# Patient Record
Sex: MALE | Race: WHITE | NOT HISPANIC OR LATINO | Employment: UNEMPLOYED | URBAN - METROPOLITAN AREA
[De-identification: names, ages, dates, MRNs, and addresses within clinical notes are randomized per-mention and may not be internally consistent; named-entity substitution may affect disease eponyms.]

---

## 2019-09-06 PROBLEM — Z13.9 ENCOUNTER FOR MEDICAL SCREENING EXAMINATION: Chronic | Status: ACTIVE | Noted: 2019-09-06

## 2019-09-06 PROBLEM — R53.83 FATIGUE: Status: ACTIVE | Noted: 2019-09-06

## 2019-09-06 PROBLEM — F29 PSYCHOSIS: Status: ACTIVE | Noted: 2019-09-06

## 2019-12-09 PROBLEM — Z13.9 ENCOUNTER FOR MEDICAL SCREENING EXAMINATION: Chronic | Status: RESOLVED | Noted: 2019-09-06 | Resolved: 2019-12-09

## 2021-02-10 PROBLEM — F32.3 MAJOR DEPRESSION WITH PSYCHOTIC FEATURES: Status: ACTIVE | Noted: 2021-02-10

## 2022-03-15 ENCOUNTER — HISTORICAL (OUTPATIENT)
Dept: ADMINISTRATIVE | Facility: HOSPITAL | Age: 37
End: 2022-03-15

## 2022-09-28 ENCOUNTER — HOSPITAL ENCOUNTER (EMERGENCY)
Facility: HOSPITAL | Age: 37
Discharge: PSYCHIATRIC OR SUBSTANCE USE FACILITY | End: 2022-09-29
Attending: FAMILY MEDICINE
Payer: MEDICAID

## 2022-09-28 VITALS
DIASTOLIC BLOOD PRESSURE: 76 MMHG | SYSTOLIC BLOOD PRESSURE: 107 MMHG | OXYGEN SATURATION: 99 % | HEIGHT: 74 IN | TEMPERATURE: 98 F | HEART RATE: 93 BPM | WEIGHT: 154.31 LBS | RESPIRATION RATE: 16 BRPM | BODY MASS INDEX: 19.8 KG/M2

## 2022-09-28 DIAGNOSIS — F19.10 DRUG ABUSE: ICD-10-CM

## 2022-09-28 DIAGNOSIS — R44.3 HALLUCINATIONS: ICD-10-CM

## 2022-09-28 DIAGNOSIS — Z00.8 MEDICAL CLEARANCE FOR PSYCHIATRIC ADMISSION: Primary | ICD-10-CM

## 2022-09-28 LAB
ALBUMIN SERPL-MCNC: 4.4 GM/DL (ref 3.5–5)
ALBUMIN/GLOB SERPL: 1.5 RATIO (ref 1.1–2)
ALP SERPL-CCNC: 77 UNIT/L (ref 40–150)
ALT SERPL-CCNC: 71 UNIT/L (ref 0–55)
AMPHET UR QL SCN: POSITIVE
APAP SERPL-MCNC: <17.4 UG/ML (ref 17.4–30)
APPEARANCE UR: CLEAR
AST SERPL-CCNC: 72 UNIT/L (ref 5–34)
BACTERIA #/AREA URNS AUTO: ABNORMAL /HPF
BARBITURATE SCN PRESENT UR: NEGATIVE
BASOPHILS # BLD AUTO: 0.05 X10(3)/MCL (ref 0–0.2)
BASOPHILS NFR BLD AUTO: 0.4 %
BENZODIAZ UR QL SCN: POSITIVE
BILIRUB UR QL STRIP.AUTO: NEGATIVE MG/DL
BILIRUBIN DIRECT+TOT PNL SERPL-MCNC: 2.6 MG/DL
BUN SERPL-MCNC: 21.7 MG/DL (ref 8.9–20.6)
CALCIUM SERPL-MCNC: 9.8 MG/DL (ref 8.4–10.2)
CANNABINOIDS UR QL SCN: NEGATIVE
CHLORIDE SERPL-SCNC: 99 MMOL/L (ref 98–107)
CO2 SERPL-SCNC: 26 MMOL/L (ref 22–29)
COCAINE UR QL SCN: NEGATIVE
COLOR UR AUTO: ABNORMAL
CREAT SERPL-MCNC: 1.02 MG/DL (ref 0.73–1.18)
EOSINOPHIL # BLD AUTO: 0.16 X10(3)/MCL (ref 0–0.9)
EOSINOPHIL NFR BLD AUTO: 1.4 %
ERYTHROCYTE [DISTWIDTH] IN BLOOD BY AUTOMATED COUNT: 14.6 % (ref 11.5–17)
ETHANOL SERPL-MCNC: <10 MG/DL
FENTANYL UR QL SCN: NEGATIVE
FLUAV AG UPPER RESP QL IA.RAPID: NOT DETECTED
FLUBV AG UPPER RESP QL IA.RAPID: NOT DETECTED
GFR SERPLBLD CREATININE-BSD FMLA CKD-EPI: >60 MLS/MIN/1.73/M2
GLOBULIN SER-MCNC: 2.9 GM/DL (ref 2.4–3.5)
GLUCOSE SERPL-MCNC: 90 MG/DL (ref 74–100)
GLUCOSE UR QL STRIP.AUTO: NORMAL MG/DL
HCT VFR BLD AUTO: 45.4 % (ref 42–52)
HGB BLD-MCNC: 15.7 GM/DL (ref 14–18)
HYALINE CASTS #/AREA URNS LPF: ABNORMAL /LPF
IMM GRANULOCYTES # BLD AUTO: 0.03 X10(3)/MCL (ref 0–0.04)
IMM GRANULOCYTES NFR BLD AUTO: 0.3 %
KETONES UR QL STRIP.AUTO: NEGATIVE MG/DL
LEUKOCYTE ESTERASE UR QL STRIP.AUTO: NEGATIVE UNIT/L
LYMPHOCYTES # BLD AUTO: 2.19 X10(3)/MCL (ref 0.6–4.6)
LYMPHOCYTES NFR BLD AUTO: 18.7 %
MCH RBC QN AUTO: 31.9 PG (ref 27–31)
MCHC RBC AUTO-ENTMCNC: 34.6 MG/DL (ref 33–36)
MCV RBC AUTO: 92.3 FL (ref 80–94)
MDMA UR QL SCN: NEGATIVE
MONOCYTES # BLD AUTO: 1.18 X10(3)/MCL (ref 0.1–1.3)
MONOCYTES NFR BLD AUTO: 10.1 %
MUCOUS THREADS URNS QL MICRO: ABNORMAL /LPF
NEUTROPHILS # BLD AUTO: 8.1 X10(3)/MCL (ref 2.1–9.2)
NEUTROPHILS NFR BLD AUTO: 69.1 %
NITRITE UR QL STRIP.AUTO: NEGATIVE
NRBC BLD AUTO-RTO: 0 %
OPIATES UR QL SCN: NEGATIVE
PCP UR QL: NEGATIVE
PH UR STRIP.AUTO: 5 [PH]
PH UR: 5 [PH] (ref 3–11)
PLATELET # BLD AUTO: 260 X10(3)/MCL (ref 130–400)
PMV BLD AUTO: 10.8 FL (ref 7.4–10.4)
POTASSIUM SERPL-SCNC: 3.2 MMOL/L (ref 3.5–5.1)
PROT SERPL-MCNC: 7.3 GM/DL (ref 6.4–8.3)
PROT UR QL STRIP.AUTO: NEGATIVE MG/DL
RBC # BLD AUTO: 4.92 X10(6)/MCL (ref 4.7–6.1)
RBC #/AREA URNS AUTO: ABNORMAL /HPF
RBC UR QL AUTO: ABNORMAL UNIT/L
SARS-COV-2 RNA RESP QL NAA+PROBE: NOT DETECTED
SODIUM SERPL-SCNC: 136 MMOL/L (ref 136–145)
SP GR UR STRIP.AUTO: 1.01
SPECIFIC GRAVITY, URINE AUTO (.000) (OHS): 1.01 (ref 1–1.03)
SQUAMOUS #/AREA URNS LPF: ABNORMAL /HPF
TSH SERPL-ACNC: 0.82 UIU/ML (ref 0.35–4.94)
UROBILINOGEN UR STRIP-ACNC: NORMAL MG/DL
WBC # SPEC AUTO: 11.7 X10(3)/MCL (ref 4.5–11.5)
WBC #/AREA URNS AUTO: ABNORMAL /HPF

## 2022-09-28 PROCEDURE — 96372 THER/PROPH/DIAG INJ SC/IM: CPT | Performed by: STUDENT IN AN ORGANIZED HEALTH CARE EDUCATION/TRAINING PROGRAM

## 2022-09-28 PROCEDURE — 80143 DRUG ASSAY ACETAMINOPHEN: CPT | Performed by: STUDENT IN AN ORGANIZED HEALTH CARE EDUCATION/TRAINING PROGRAM

## 2022-09-28 PROCEDURE — 93005 ELECTROCARDIOGRAM TRACING: CPT

## 2022-09-28 PROCEDURE — 87636 SARSCOV2 & INF A&B AMP PRB: CPT | Performed by: STUDENT IN AN ORGANIZED HEALTH CARE EDUCATION/TRAINING PROGRAM

## 2022-09-28 PROCEDURE — 80053 COMPREHEN METABOLIC PANEL: CPT | Performed by: STUDENT IN AN ORGANIZED HEALTH CARE EDUCATION/TRAINING PROGRAM

## 2022-09-28 PROCEDURE — 80307 DRUG TEST PRSMV CHEM ANLYZR: CPT | Performed by: STUDENT IN AN ORGANIZED HEALTH CARE EDUCATION/TRAINING PROGRAM

## 2022-09-28 PROCEDURE — 63600175 PHARM REV CODE 636 W HCPCS: Performed by: STUDENT IN AN ORGANIZED HEALTH CARE EDUCATION/TRAINING PROGRAM

## 2022-09-28 PROCEDURE — 36415 COLL VENOUS BLD VENIPUNCTURE: CPT | Performed by: STUDENT IN AN ORGANIZED HEALTH CARE EDUCATION/TRAINING PROGRAM

## 2022-09-28 PROCEDURE — 99285 EMERGENCY DEPT VISIT HI MDM: CPT | Mod: 25

## 2022-09-28 PROCEDURE — 81001 URINALYSIS AUTO W/SCOPE: CPT | Performed by: STUDENT IN AN ORGANIZED HEALTH CARE EDUCATION/TRAINING PROGRAM

## 2022-09-28 PROCEDURE — 82077 ASSAY SPEC XCP UR&BREATH IA: CPT | Performed by: STUDENT IN AN ORGANIZED HEALTH CARE EDUCATION/TRAINING PROGRAM

## 2022-09-28 PROCEDURE — 85025 COMPLETE CBC W/AUTO DIFF WBC: CPT | Performed by: STUDENT IN AN ORGANIZED HEALTH CARE EDUCATION/TRAINING PROGRAM

## 2022-09-28 PROCEDURE — 84443 ASSAY THYROID STIM HORMONE: CPT | Performed by: STUDENT IN AN ORGANIZED HEALTH CARE EDUCATION/TRAINING PROGRAM

## 2022-09-28 PROCEDURE — 25000003 PHARM REV CODE 250: Performed by: STUDENT IN AN ORGANIZED HEALTH CARE EDUCATION/TRAINING PROGRAM

## 2022-09-28 RX ORDER — MIDAZOLAM HYDROCHLORIDE 1 MG/ML
2 INJECTION INTRAMUSCULAR; INTRAVENOUS
Status: COMPLETED | OUTPATIENT
Start: 2022-09-28 | End: 2022-09-28

## 2022-09-28 RX ORDER — ZIPRASIDONE MESYLATE 20 MG/ML
20 INJECTION, POWDER, LYOPHILIZED, FOR SOLUTION INTRAMUSCULAR
Status: COMPLETED | OUTPATIENT
Start: 2022-09-28 | End: 2022-09-28

## 2022-09-28 RX ORDER — DIPHENHYDRAMINE HYDROCHLORIDE 50 MG/ML
50 INJECTION INTRAMUSCULAR; INTRAVENOUS
Status: COMPLETED | OUTPATIENT
Start: 2022-09-28 | End: 2022-09-28

## 2022-09-28 RX ADMIN — DIPHENHYDRAMINE HYDROCHLORIDE 50 MG: 50 INJECTION INTRAMUSCULAR; INTRAVENOUS at 03:09

## 2022-09-28 RX ADMIN — MIDAZOLAM 2 MG: 1 INJECTION INTRAMUSCULAR; INTRAVENOUS at 03:09

## 2022-09-28 RX ADMIN — SODIUM CHLORIDE 1000 ML: 9 INJECTION, SOLUTION INTRAVENOUS at 04:09

## 2022-09-28 RX ADMIN — ZIPRASIDONE MESYLATE 20 MG: 20 INJECTION, POWDER, LYOPHILIZED, FOR SOLUTION INTRAMUSCULAR at 03:09

## 2022-09-28 NOTE — ED PROVIDER NOTES
Encounter Date: 9/28/2022       History     Chief Complaint   Patient presents with    Drug Overdose     Pt here for altered mental status due to possible drug use. No traumatic injures noted at this time.      Patient 37-year-old male presents to ED for altered mental status.  Brought in by EMS.  Apparently presented franticly to a restaurant requesting help me. endorsed drug use but did not specify which.  He was hallucinating, paranoid, talking himself. initially tachycardia. denies any alcohol abuse, no trauma, is actively responding to internal stimuli. denies any physical complaints. No shortness of breath, no other complaints or concerns at this time.    Review of patient's allergies indicates:  No Known Allergies  History reviewed. No pertinent past medical history.  History reviewed. No pertinent surgical history.  History reviewed. No pertinent family history.  Social History     Tobacco Use    Smoking status: Every Day     Packs/day: 1.00     Types: Cigarettes    Smokeless tobacco: Never   Substance Use Topics    Drug use: Not Currently     Review of Systems   Constitutional:  Negative for chills and fever.   HENT:  Negative for congestion, rhinorrhea and sore throat.    Eyes:  Negative for pain, discharge and itching.   Respiratory:  Negative for chest tightness and shortness of breath.    Cardiovascular:  Negative for chest pain and palpitations.   Gastrointestinal:  Negative for abdominal pain, nausea and vomiting.   Genitourinary:  Negative for dysuria and hematuria.   Musculoskeletal:  Negative for myalgias and neck pain.   Skin:  Negative for color change and rash.   Neurological:  Negative for dizziness, weakness and headaches.   Psychiatric/Behavioral:  Positive for hallucinations. Negative for confusion and suicidal ideas. The patient is nervous/anxious and is hyperactive.      Physical Exam     Initial Vitals [09/28/22 1417]   BP Pulse Resp Temp SpO2   119/80 (!) 126 16 98.2 °F (36.8 °C) 99 %       MAP       --         Physical Exam    Constitutional: He appears well-developed and well-nourished. He is not diaphoretic. No distress.   HENT:   Head: Normocephalic and atraumatic.   Eyes: Conjunctivae and EOM are normal. Pupils are equal, round, and reactive to light.   Neck: Neck supple. No tracheal deviation present.   Normal range of motion.  Cardiovascular:  Regular rhythm and normal heart sounds.   Tachycardia present.         Pulmonary/Chest: Breath sounds normal. No respiratory distress.   Abdominal: Abdomen is soft. There is no abdominal tenderness. There is no rebound.   Musculoskeletal:         General: No tenderness. Normal range of motion.      Cervical back: Normal range of motion and neck supple.     Neurological: He is alert and oriented to person, place, and time. He has normal strength. GCS score is 15. GCS eye subscore is 4. GCS verbal subscore is 5. GCS motor subscore is 6.   Skin: Skin is warm and dry. Capillary refill takes less than 2 seconds. No rash noted.   Psychiatric: His behavior is normal. Judgment normal. His mood appears anxious. His speech is rapid and/or pressured. He is actively hallucinating. Thought content is paranoid and delusional. He expresses no homicidal and no suicidal ideation. He expresses no suicidal plans and no homicidal plans. He is attentive.       ED Course   Critical Care    Date/Time: 9/28/2022 4:58 PM  Performed by: Rodney De La Torre MD  Authorized by: Rodney De La Torre MD   Total critical care time (exclusive of procedural time) : 30 minutes  Critical care time was exclusive of separately billable procedures and treating other patients.  Critical care was necessary to treat or prevent imminent or life-threatening deterioration of the following conditions: CNS failure or compromise.  Critical care was time spent personally by me on the following activities: evaluation of patient's response to treatment, obtaining history from patient or surrogate, ordering and review  of laboratory studies, pulse oximetry, review of old charts, development of treatment plan with patient or surrogate, examination of patient, ordering and performing treatments and interventions, ordering and review of radiographic studies and re-evaluation of patient's condition.  Comments: Chemical sedation      Labs Reviewed   COMPREHENSIVE METABOLIC PANEL - Abnormal; Notable for the following components:       Result Value    Potassium Level 3.2 (*)     Blood Urea Nitrogen 21.7 (*)     Bilirubin Total 2.6 (*)     Alanine Aminotransferase 71 (*)     Aspartate Aminotransferase 72 (*)     All other components within normal limits   ACETAMINOPHEN LEVEL - Abnormal; Notable for the following components:    Acetaminophen Level <17.4 (*)     All other components within normal limits   CBC WITH DIFFERENTIAL - Abnormal; Notable for the following components:    WBC 11.7 (*)     MCH 31.9 (*)     MPV 10.8 (*)     All other components within normal limits   TSH - Normal   ALCOHOL,MEDICAL (ETHANOL) - Normal   COVID/FLU A&B PCR - Normal   CBC W/ AUTO DIFFERENTIAL    Narrative:     The following orders were created for panel order CBC auto differential.  Procedure                               Abnormality         Status                     ---------                               -----------         ------                     CBC with Differential[[id number]]        Abnormal            Final result                 Please view results for these tests on the individual orders.   URINALYSIS, REFLEX TO URINE CULTURE   DRUG SCREEN, URINE (BEAKER)   EXTRA TUBES    Narrative:     The following orders were created for panel order EXTRA TUBES.  Procedure                               Abnormality         Status                     ---------                               -----------         ------                     Light Blue Top Hold[[id number]]                              In process                 Red Top Hold[[id number]]                                      In process                   Please view results for these tests on the individual orders.   LIGHT BLUE TOP HOLD   RED TOP HOLD     EKG Readings: (Independently Interpreted)   Initial Reading: No STEMI. Rhythm: Sinus Tachycardia. Ectopy: No Ectopy. Conduction: Normal. Axis: Normal. Clinical Impression: Sinus Tachycardia   ECG Results              EKG 12-lead (In process)  Result time 09/28/22 15:07:54      In process by Interface, Lab In [facility] (09/28/22 15:07:54)                   Narrative:    Test Reason : R07.9,    Vent. Rate : 105 BPM     Atrial Rate : 105 BPM     P-R Int : 130 ms          QRS Dur : 098 ms      QT Int : 364 ms       P-R-T Axes : 075 081 055 degrees     QTc Int : 481 ms    Sinus tachycardia  Incomplete right bundle branch block  Borderline Abnormal ECG  No previous ECGs available    Referred By: [email or web address]   SELF           Confirmed By:                                   Imaging Results    None          Medications   sodium chloride 0.9% bolus 1,000 mL (1,000 mLs Intravenous New Bag 9/28/22 1603)   ziprasidone injection 20 mg (20 mg Intramuscular Given 9/28/22 1541)   midazolam (VERSED) 1 mg/mL injection 2 mg (2 mg Intramuscular Given 9/28/22 1541)   diphenhydrAMINE injection 50 mg (50 mg Intramuscular Given 9/28/22 1541)     Medical Decision Making:   Clinical Tests:   Lab Tests: Reviewed and Ordered  Medical Tests: Reviewed and Ordered  ED Management:  37-year-old male presents to ED after street drug use. hallucinating, responding to internal stimuli and paranoid. Has stable vitals besides initial tachycardia.  On exam no acute findings besides tachycardia and psychiatric findings as listed above. In room pt remained paranoid and verbally loud. persisted.  His ultimately given skin medical sedation for strain.  Afterwards he was resting comfortably in and IV was started and fluid was provided.  Tachycardia resolved. Laboratory analysis grossly unremarkable. At this time  patient is cleared for placement at 1st available psychiatric facility. PEC'd for patient's safety. Awaiting acceptance and transfer at this time. (Britt)                        Clinical Impression:   Final diagnoses:  [Z00.8] Medical clearance for psychiatric admission (Primary)  [R44.3] Hallucinations  [F19.10] Drug abuse        ED Disposition Condition    Transfer to Psych Facility Stable          ED Prescriptions    None       Follow-up Information    None          Rodney De La Torre MD  09/28/22 7484

## 2022-09-29 NOTE — ED NOTES
Pt discharged to [facility] for transport to [facility]

## 2022-11-09 ENCOUNTER — HOSPITAL ENCOUNTER (EMERGENCY)
Facility: HOSPITAL | Age: 37
Discharge: PSYCHIATRIC OR SUBSTANCE USE FACILITY | End: 2022-11-09
Attending: EMERGENCY MEDICINE
Payer: MEDICAID

## 2022-11-09 VITALS
DIASTOLIC BLOOD PRESSURE: 73 MMHG | HEART RATE: 95 BPM | WEIGHT: 140 LBS | TEMPERATURE: 99 F | HEIGHT: 74 IN | RESPIRATION RATE: 22 BRPM | BODY MASS INDEX: 17.97 KG/M2 | SYSTOLIC BLOOD PRESSURE: 120 MMHG | OXYGEN SATURATION: 100 %

## 2022-11-09 DIAGNOSIS — F29 PSYCHOSIS, UNSPECIFIED PSYCHOSIS TYPE: Primary | ICD-10-CM

## 2022-11-09 DIAGNOSIS — Z00.8 MEDICAL CLEARANCE FOR PSYCHIATRIC ADMISSION: ICD-10-CM

## 2022-11-09 LAB
ALBUMIN SERPL-MCNC: 4 GM/DL (ref 3.5–5)
ALBUMIN/GLOB SERPL: 1.5 RATIO (ref 1.1–2)
ALP SERPL-CCNC: 84 UNIT/L (ref 40–150)
ALT SERPL-CCNC: 23 UNIT/L (ref 0–55)
APAP SERPL-MCNC: <17.4 UG/ML (ref 17.4–30)
AST SERPL-CCNC: 30 UNIT/L (ref 5–34)
BASOPHILS # BLD AUTO: 0.04 X10(3)/MCL (ref 0–0.2)
BASOPHILS NFR BLD AUTO: 0.6 %
BILIRUBIN DIRECT+TOT PNL SERPL-MCNC: 0.8 MG/DL
BUN SERPL-MCNC: 7.5 MG/DL (ref 8.9–20.6)
CALCIUM SERPL-MCNC: 9.5 MG/DL (ref 8.4–10.2)
CHLORIDE SERPL-SCNC: 105 MMOL/L (ref 98–107)
CO2 SERPL-SCNC: 22 MMOL/L (ref 22–29)
CREAT SERPL-MCNC: 0.8 MG/DL (ref 0.73–1.18)
EOSINOPHIL # BLD AUTO: 0.06 X10(3)/MCL (ref 0–0.9)
EOSINOPHIL NFR BLD AUTO: 1 %
ERYTHROCYTE [DISTWIDTH] IN BLOOD BY AUTOMATED COUNT: 13.3 % (ref 11.5–17)
ETHANOL SERPL-MCNC: 84 MG/DL
GFR SERPLBLD CREATININE-BSD FMLA CKD-EPI: >60 MLS/MIN/1.73/M2
GLOBULIN SER-MCNC: 2.7 GM/DL (ref 2.4–3.5)
GLUCOSE SERPL-MCNC: 69 MG/DL (ref 74–100)
HCT VFR BLD AUTO: 41.3 % (ref 42–52)
HGB BLD-MCNC: 14.3 GM/DL (ref 14–18)
IMM GRANULOCYTES # BLD AUTO: 0.04 X10(3)/MCL (ref 0–0.04)
IMM GRANULOCYTES NFR BLD AUTO: 0.6 %
LYMPHOCYTES # BLD AUTO: 1.79 X10(3)/MCL (ref 0.6–4.6)
LYMPHOCYTES NFR BLD AUTO: 28.6 %
MCH RBC QN AUTO: 31.6 PG (ref 27–31)
MCHC RBC AUTO-ENTMCNC: 34.6 MG/DL (ref 33–36)
MCV RBC AUTO: 91.2 FL (ref 80–94)
MONOCYTES # BLD AUTO: 0.83 X10(3)/MCL (ref 0.1–1.3)
MONOCYTES NFR BLD AUTO: 13.3 %
NEUTROPHILS # BLD AUTO: 3.5 X10(3)/MCL (ref 2.1–9.2)
NEUTROPHILS NFR BLD AUTO: 55.9 %
NRBC BLD AUTO-RTO: 0 %
PLATELET # BLD AUTO: 213 X10(3)/MCL (ref 130–400)
PMV BLD AUTO: 11 FL (ref 7.4–10.4)
POTASSIUM SERPL-SCNC: 3.5 MMOL/L (ref 3.5–5.1)
PROT SERPL-MCNC: 6.7 GM/DL (ref 6.4–8.3)
RBC # BLD AUTO: 4.53 X10(6)/MCL (ref 4.7–6.1)
SALICYLATES SERPL-MCNC: <5 MG/DL
SARS-COV-2 RDRP RESP QL NAA+PROBE: NEGATIVE
SODIUM SERPL-SCNC: 140 MMOL/L (ref 136–145)
TSH SERPL-ACNC: 0.64 UIU/ML (ref 0.35–4.94)
WBC # SPEC AUTO: 6.3 X10(3)/MCL (ref 4.5–11.5)

## 2022-11-09 PROCEDURE — 99285 EMERGENCY DEPT VISIT HI MDM: CPT | Mod: 25

## 2022-11-09 PROCEDURE — 85025 COMPLETE CBC W/AUTO DIFF WBC: CPT | Performed by: EMERGENCY MEDICINE

## 2022-11-09 PROCEDURE — 63600175 PHARM REV CODE 636 W HCPCS: Performed by: EMERGENCY MEDICINE

## 2022-11-09 PROCEDURE — 80053 COMPREHEN METABOLIC PANEL: CPT | Performed by: EMERGENCY MEDICINE

## 2022-11-09 PROCEDURE — 80179 DRUG ASSAY SALICYLATE: CPT | Performed by: EMERGENCY MEDICINE

## 2022-11-09 PROCEDURE — 84443 ASSAY THYROID STIM HORMONE: CPT | Performed by: EMERGENCY MEDICINE

## 2022-11-09 PROCEDURE — 80143 DRUG ASSAY ACETAMINOPHEN: CPT | Performed by: EMERGENCY MEDICINE

## 2022-11-09 PROCEDURE — 96372 THER/PROPH/DIAG INJ SC/IM: CPT | Performed by: EMERGENCY MEDICINE

## 2022-11-09 PROCEDURE — 87635 SARS-COV-2 COVID-19 AMP PRB: CPT | Performed by: EMERGENCY MEDICINE

## 2022-11-09 PROCEDURE — 82077 ASSAY SPEC XCP UR&BREATH IA: CPT | Performed by: EMERGENCY MEDICINE

## 2022-11-09 RX ORDER — HALOPERIDOL 5 MG/ML
10 INJECTION INTRAMUSCULAR
Status: COMPLETED | OUTPATIENT
Start: 2022-11-09 | End: 2022-11-09

## 2022-11-09 RX ORDER — DIPHENHYDRAMINE HYDROCHLORIDE 50 MG/ML
25 INJECTION INTRAMUSCULAR; INTRAVENOUS
Status: COMPLETED | OUTPATIENT
Start: 2022-11-09 | End: 2022-11-09

## 2022-11-09 RX ORDER — LORAZEPAM 2 MG/ML
2 INJECTION INTRAMUSCULAR
Status: COMPLETED | OUTPATIENT
Start: 2022-11-09 | End: 2022-11-09

## 2022-11-09 RX ADMIN — HALOPERIDOL LACTATE 10 MG: 5 INJECTION, SOLUTION INTRAMUSCULAR at 02:11

## 2022-11-09 RX ADMIN — LORAZEPAM 2 MG: 2 INJECTION INTRAMUSCULAR; INTRAVENOUS at 02:11

## 2022-11-09 RX ADMIN — DIPHENHYDRAMINE HYDROCHLORIDE 25 MG: 50 INJECTION INTRAMUSCULAR; INTRAVENOUS at 02:11

## 2022-11-09 NOTE — ED PROVIDER NOTES
Encounter Date: 11/9/2022       History   No chief complaint on file.    The history is provided by the patient.   Mental Health Problem  The primary symptoms include bizarre behavior, disorganized speech and disorganized thinking. The current episode started today. This is a recurrent problem.   The onset of the illness is precipitated by drug abuse.   Review of patient's allergies indicates:  No Known Allergies  No past medical history on file.  No past surgical history on file.  No family history on file.  Social History     Tobacco Use    Smoking status: Every Day     Packs/day: 1.00     Types: Cigarettes    Smokeless tobacco: Never   Substance Use Topics    Drug use: Not Currently     Review of Systems   Unable to perform ROS: Psychiatric disorder     Physical Exam     Initial Vitals [11/09/22 1436]   BP Pulse Resp Temp SpO2   120/73 95 (!) 22 98.9 °F (37.2 °C) 100 %      MAP       --         Physical Exam    Nursing note and vitals reviewed.  Constitutional: He appears well-developed and well-nourished. No distress.   HENT:   Head: Normocephalic and atraumatic.   Mouth/Throat: Oropharynx is clear and moist.   Eyes: Conjunctivae and EOM are normal. Pupils are equal, round, and reactive to light.   Neck: Neck supple.   Normal range of motion.  Cardiovascular:  Normal rate, regular rhythm and normal heart sounds.     Exam reveals no gallop and no friction rub.       No murmur heard.  Pulmonary/Chest: Breath sounds normal. No respiratory distress. He has no wheezes. He has no rhonchi. He has no rales.   Abdominal: Abdomen is soft. Bowel sounds are normal. He exhibits no distension and no mass. There is no abdominal tenderness. There is no rebound and no guarding.   Musculoskeletal:         General: No edema. Normal range of motion.      Cervical back: Normal range of motion and neck supple.     Neurological: He is alert. He has normal strength.   Skin: Skin is warm and dry. No rash noted.   Psychiatric: His affect  is labile and inappropriate. His speech is rapid and/or pressured. He is agitated and hyperactive. Thought content is delusional.       ED Course   Procedures  Labs Reviewed   COMPREHENSIVE METABOLIC PANEL - Abnormal; Notable for the following components:       Result Value    Glucose Level 69 (*)     Blood Urea Nitrogen 7.5 (*)     All other components within normal limits   ALCOHOL,MEDICAL (ETHANOL) - Abnormal; Notable for the following components:    Ethanol Level 84.0 (*)     All other components within normal limits   ACETAMINOPHEN LEVEL - Abnormal; Notable for the following components:    Acetaminophen Level <17.4 (*)     All other components within normal limits   CBC WITH DIFFERENTIAL - Abnormal; Notable for the following components:    RBC 4.53 (*)     Hct 41.3 (*)     MCH 31.6 (*)     MPV 11.0 (*)     All other components within normal limits   SARS-COV-2 RNA AMPLIFICATION, QUAL - Normal    Narrative:     The IDNOW COVID-19 assay is a rapid molecular in vitro diagnostic test utilizing an isothermal nucleic acid amplification technology intended for the qualitative detection of nucleic acid from the SARS-CoV-2 viral RNA in direct nasal, nasopharyngeal or throat swabs from individuals who are suspected of COVID-19 by their healthcare provider.   CBC W/ AUTO DIFFERENTIAL    Narrative:     The following orders were created for panel order CBC Auto Differential.  Procedure                               Abnormality         Status                     ---------                               -----------         ------                     CBC with Differential[[id number]]        Abnormal            Final result                 Please view results for these tests on the individual orders.   SALICYLATE LEVEL   URINALYSIS, REFLEX TO URINE CULTURE   TSH   DRUG SCREEN, URINE (BEAKER)   EXTRA TUBES    Narrative:     The following orders were created for panel order EXTRA TUBES.  Procedure                                Abnormality         Status                     ---------                               -----------         ------                     Gold Top Hold[[id number]]                                    In process                   Please view results for these tests on the individual orders.   GOLD TOP HOLD          Imaging Results    None          Medications   haloperidol lactate injection 10 mg (10 mg Intramuscular Given 11/9/22 1445)   diphenhydrAMINE injection 25 mg (25 mg Intramuscular Given 11/9/22 1445)   LORazepam injection 2 mg (2 mg Intramuscular Given 11/9/22 1445)                    Medically cleared for psychiatry placement: 11/9/2022  3:50 PM         Clinical Impression:   Final diagnoses:  [F29] Psychosis, unspecified psychosis type (Primary)  [Z00.8] Medical clearance for psychiatric admission      ED Disposition Condition    Transfer to Psych Facility Stable          ED Prescriptions    None       Follow-up Information    None          Eduard Marie MD  11/09/22 2420

## 2022-12-15 ENCOUNTER — HOSPITAL ENCOUNTER (EMERGENCY)
Facility: HOSPITAL | Age: 37
Discharge: PSYCHIATRIC OR SUBSTANCE USE FACILITY | End: 2022-12-15
Attending: STUDENT IN AN ORGANIZED HEALTH CARE EDUCATION/TRAINING PROGRAM
Payer: MEDICAID

## 2022-12-15 VITALS
BODY MASS INDEX: 18.61 KG/M2 | TEMPERATURE: 98 F | RESPIRATION RATE: 18 BRPM | SYSTOLIC BLOOD PRESSURE: 119 MMHG | OXYGEN SATURATION: 98 % | HEART RATE: 73 BPM | HEIGHT: 70 IN | DIASTOLIC BLOOD PRESSURE: 67 MMHG | WEIGHT: 130 LBS

## 2022-12-15 DIAGNOSIS — F15.10 METHAMPHETAMINE ABUSE: ICD-10-CM

## 2022-12-15 DIAGNOSIS — F23 ACUTE PSYCHOSIS: Primary | ICD-10-CM

## 2022-12-15 DIAGNOSIS — F19.10 SUBSTANCE ABUSE: ICD-10-CM

## 2022-12-15 DIAGNOSIS — Z00.8 MEDICAL CLEARANCE FOR PSYCHIATRIC ADMISSION: ICD-10-CM

## 2022-12-15 LAB
ALBUMIN SERPL-MCNC: 3.9 G/DL (ref 3.5–5)
ALBUMIN/GLOB SERPL: 1.3 RATIO (ref 1.1–2)
ALP SERPL-CCNC: 112 UNIT/L (ref 40–150)
ALT SERPL-CCNC: 20 UNIT/L (ref 0–55)
AMPHET UR QL SCN: POSITIVE
APAP SERPL-MCNC: <17.4 UG/ML (ref 17.4–30)
APPEARANCE UR: CLEAR
AST SERPL-CCNC: 29 UNIT/L (ref 5–34)
BACTERIA #/AREA URNS AUTO: NORMAL /HPF
BARBITURATE SCN PRESENT UR: NEGATIVE
BASOPHILS # BLD AUTO: 0.04 X10(3)/MCL (ref 0–0.2)
BASOPHILS NFR BLD AUTO: 0.5 %
BENZODIAZ UR QL SCN: NEGATIVE
BILIRUB UR QL STRIP.AUTO: NEGATIVE MG/DL
BILIRUBIN DIRECT+TOT PNL SERPL-MCNC: 0.5 MG/DL
BUN SERPL-MCNC: 10.7 MG/DL (ref 8.9–20.6)
CALCIUM SERPL-MCNC: 9.6 MG/DL (ref 8.4–10.2)
CANNABINOIDS UR QL SCN: NEGATIVE
CHLORIDE SERPL-SCNC: 105 MMOL/L (ref 98–107)
CO2 SERPL-SCNC: 22 MMOL/L (ref 22–29)
COCAINE UR QL SCN: NEGATIVE
COLOR UR AUTO: YELLOW
CREAT SERPL-MCNC: 0.98 MG/DL (ref 0.73–1.18)
EOSINOPHIL # BLD AUTO: 0.08 X10(3)/MCL (ref 0–0.9)
EOSINOPHIL NFR BLD AUTO: 1 %
ERYTHROCYTE [DISTWIDTH] IN BLOOD BY AUTOMATED COUNT: 13.6 % (ref 11.6–14.4)
ETHANOL SERPL-MCNC: 67 MG/DL
FENTANYL UR QL SCN: NEGATIVE
GFR SERPLBLD CREATININE-BSD FMLA CKD-EPI: >60 MLS/MIN/1.73/M2
GLOBULIN SER-MCNC: 2.9 GM/DL (ref 2.4–3.5)
GLUCOSE SERPL-MCNC: 82 MG/DL (ref 74–100)
GLUCOSE UR QL STRIP.AUTO: NEGATIVE MG/DL
HCT VFR BLD AUTO: 42.7 % (ref 42–52)
HGB BLD-MCNC: 14.8 GM/DL (ref 14–18)
IMM GRANULOCYTES # BLD AUTO: 0.02 X10(3)/MCL (ref 0–0.04)
IMM GRANULOCYTES NFR BLD AUTO: 0.2 %
KETONES UR QL STRIP.AUTO: NEGATIVE MG/DL
LEUKOCYTE ESTERASE UR QL STRIP.AUTO: NEGATIVE UNIT/L
LYMPHOCYTES # BLD AUTO: 2.4 X10(3)/MCL (ref 0.6–4.6)
LYMPHOCYTES NFR BLD AUTO: 29.6 %
MCH RBC QN AUTO: 31.4 PG
MCHC RBC AUTO-ENTMCNC: 34.7 MG/DL (ref 33–36)
MCV RBC AUTO: 90.7 FL (ref 80–94)
MDMA UR QL SCN: NEGATIVE
MONOCYTES # BLD AUTO: 1.18 X10(3)/MCL (ref 0.1–1.3)
MONOCYTES NFR BLD AUTO: 14.5 %
NEUTROPHILS # BLD AUTO: 4.4 X10(3)/MCL (ref 2.1–9.2)
NEUTROPHILS NFR BLD AUTO: 54.2 %
NITRITE UR QL STRIP.AUTO: NEGATIVE
NRBC BLD AUTO-RTO: 0 % (ref 0–1)
OPIATES UR QL SCN: NEGATIVE
PCP UR QL: NEGATIVE
PH UR STRIP.AUTO: 5.5 [PH]
PH UR: 5.5 [PH] (ref 3–11)
PLATELET # BLD AUTO: 242 X10(3)/MCL (ref 140–371)
PMV BLD AUTO: 11.2 FL (ref 9.4–12.4)
POTASSIUM SERPL-SCNC: 4.1 MMOL/L (ref 3.5–5.1)
PROT SERPL-MCNC: 6.8 GM/DL (ref 6.4–8.3)
PROT UR QL STRIP.AUTO: NEGATIVE MG/DL
RBC # BLD AUTO: 4.71 X10(6)/MCL (ref 4.7–6.1)
RBC #/AREA URNS AUTO: <5 /HPF
RBC UR QL AUTO: NEGATIVE UNIT/L
SARS-COV-2 RDRP RESP QL NAA+PROBE: NEGATIVE
SODIUM SERPL-SCNC: 140 MMOL/L (ref 136–145)
SP GR UR STRIP.AUTO: 1 (ref 1–1.03)
SQUAMOUS #/AREA URNS AUTO: <5 /HPF
TSH SERPL-ACNC: 1.02 UIU/ML (ref 0.35–4.94)
UROBILINOGEN UR STRIP-ACNC: 0.2 MG/DL
WBC # SPEC AUTO: 8.1 X10(3)/MCL (ref 4.5–11.5)
WBC #/AREA URNS AUTO: <5 /HPF

## 2022-12-15 PROCEDURE — 80307 DRUG TEST PRSMV CHEM ANLYZR: CPT | Performed by: STUDENT IN AN ORGANIZED HEALTH CARE EDUCATION/TRAINING PROGRAM

## 2022-12-15 PROCEDURE — 80143 DRUG ASSAY ACETAMINOPHEN: CPT | Performed by: STUDENT IN AN ORGANIZED HEALTH CARE EDUCATION/TRAINING PROGRAM

## 2022-12-15 PROCEDURE — 99285 EMERGENCY DEPT VISIT HI MDM: CPT

## 2022-12-15 PROCEDURE — 80053 COMPREHEN METABOLIC PANEL: CPT | Performed by: STUDENT IN AN ORGANIZED HEALTH CARE EDUCATION/TRAINING PROGRAM

## 2022-12-15 PROCEDURE — 84443 ASSAY THYROID STIM HORMONE: CPT | Performed by: STUDENT IN AN ORGANIZED HEALTH CARE EDUCATION/TRAINING PROGRAM

## 2022-12-15 PROCEDURE — 82077 ASSAY SPEC XCP UR&BREATH IA: CPT | Performed by: STUDENT IN AN ORGANIZED HEALTH CARE EDUCATION/TRAINING PROGRAM

## 2022-12-15 PROCEDURE — 81001 URINALYSIS AUTO W/SCOPE: CPT | Performed by: STUDENT IN AN ORGANIZED HEALTH CARE EDUCATION/TRAINING PROGRAM

## 2022-12-15 PROCEDURE — 85025 COMPLETE CBC W/AUTO DIFF WBC: CPT | Performed by: STUDENT IN AN ORGANIZED HEALTH CARE EDUCATION/TRAINING PROGRAM

## 2022-12-15 PROCEDURE — 87635 SARS-COV-2 COVID-19 AMP PRB: CPT | Performed by: STUDENT IN AN ORGANIZED HEALTH CARE EDUCATION/TRAINING PROGRAM

## 2022-12-15 NOTE — ED PROVIDER NOTES
"Encounter Date: 12/15/2022    SCRIBE #1 NOTE: I, Tomnoel Patel, am scribing for, and in the presence of,  Manpreet Boswell MD. I have scribed the following portions of the note - Other sections scribed: HPI, ROS, Physical exam.     History     Chief Complaint   Patient presents with    Addiction Problem     Bystanders contacted EMS for pt wellbeing due to pt hitting head on the wall. Pt stated that he took meth. Pt in EMS restraints. Pt unable to keep still in triage.       This is a 37 y.o. male who presents with complaint of altered mental status with onset prior to arrival. Patient was brought in by EMS. Bystanders contacted EMS after seeing patient hitting his head on a wall. Patient states that he "got into some stuff." Patient adds that he has some pain in his hands, but he denies any other somatic complaints. Patient denies any hallucinations and suicidal or homicidal ideation. When asked if he did any drugs the patient does not answer.    PEC at 17:00.    The history is provided by the patient and the EMS personnel. The history is limited by the condition of the patient.   Mental Health Problem  The primary symptoms include bizarre behavior. The primary symptoms do not include hallucinations or suicidal ideas. The current episode started just prior to arrival. This is a new problem.   Additional symptoms of the illness do not include fatigue, headaches or abdominal pain.   Review of patient's allergies indicates:  No Known Allergies  No past medical history on file.  No past surgical history on file.  No family history on file.  Social History     Tobacco Use    Smoking status: Every Day     Packs/day: 1.00     Types: Cigarettes    Smokeless tobacco: Never   Substance Use Topics    Drug use: Not Currently     Review of Systems   Constitutional:  Negative for chills, fatigue and fever.   HENT:  Negative for congestion, ear discharge, ear pain, nosebleeds, rhinorrhea and sore throat.    Eyes:  Negative for " pain, redness and visual disturbance.   Respiratory:  Negative for cough, chest tightness and shortness of breath.    Cardiovascular:  Negative for chest pain and leg swelling.   Gastrointestinal:  Negative for abdominal pain, blood in stool, constipation, diarrhea, nausea and vomiting.   Genitourinary:  Negative for dysuria and hematuria.   Musculoskeletal:  Negative for arthralgias, joint swelling, myalgias and neck pain.   Skin:  Negative for color change, pallor and wound.   Neurological:  Negative for dizziness, weakness, light-headedness, numbness and headaches.   Psychiatric/Behavioral:  Positive for behavioral problems. Negative for hallucinations and suicidal ideas.      Physical Exam     Initial Vitals [12/15/22 1638]   BP Pulse Resp Temp SpO2   123/87 108 18 98.1 °F (36.7 °C) 100 %      MAP       --         Physical Exam    Nursing note and vitals reviewed.  Constitutional: He appears well-developed and well-nourished. He is not diaphoretic. No distress.   HENT:   Head: Normocephalic and atraumatic.   Right Ear: External ear normal.   Left Ear: External ear normal.   Nose: Nose normal.   Mouth/Throat: Oropharynx is clear and moist.   Eyes: Conjunctivae and EOM are normal. Pupils are equal, round, and reactive to light. Right eye exhibits no discharge. Left eye exhibits no discharge.   Neck: Neck supple. No tracheal deviation present.   Normal range of motion.  Cardiovascular:  Normal rate, regular rhythm, normal heart sounds and intact distal pulses.     Exam reveals no gallop and no friction rub.       No murmur heard.  Pulmonary/Chest: Breath sounds normal. No stridor. No respiratory distress. He has no wheezes. He has no rhonchi. He has no rales. He exhibits no tenderness.   Abdominal: Abdomen is soft. Bowel sounds are normal. He exhibits no distension and no mass. There is no abdominal tenderness. There is no guarding.   Musculoskeletal:         General: No tenderness or edema. Normal range of motion.       Cervical back: Normal range of motion and neck supple.     Neurological: He is alert and oriented to person, place, and time. No cranial nerve deficit or sensory deficit.   Skin: Skin is warm and dry. Capillary refill takes less than 2 seconds. No rash noted. No erythema. No pallor.   Psychiatric:   Patient is gravely disabled. He is agitated and restless. Patient is smacking his lips constantly.        ED Course   Procedures  Labs Reviewed   DRUG SCREEN, URINE (BEAKER) - Abnormal; Notable for the following components:       Result Value    Amphetamines, Urine Positive (*)     All other components within normal limits    Narrative:     Cut off concentrations:    Amphetamines - 1000 ng/ml  Barbiturates - 200 ng/ml  Benzodiazepine - 200 ng/ml  Cannabinoids (THC) - 50 ng/ml  Cocaine - 300 ng/ml  Fentanyl - 1.0 ng/ml  MDMA - 500 ng/ml  Opiates - 300 ng/ml   Phencyclidine (PCP) - 25 ng/ml    Specimen submitted for drug analysis and tested for pH and specific gravity in order to evaluate sample integrity. Suspect tampering if specific gravity is <1.003 and/or pH is not within the range of 4.5 - 8.0  False negatives may result form substances such as bleach added to urine.  False positives may result for the presence of a substance with similar chemical structure to the drug or its metabolite.    This test provides only a PRELIMINARY analytical test result. A more specific alternate chemical method must be used in order to obtain a confirmed analytical result. Gas chromatography/mass spectrometry (GC/MS) is the preferred confirmatory method. Other chemical confirmation methods are available. Clinical consideration and professional judgement should be applied to any drug of abuse test result, particularly when preliminary positive results are used.    Positive results will be confirmed only at the physicians request. Unconfirmed screening results are to be used only for medical purposes (treatment).         ALCOHOL,MEDICAL (ETHANOL) - Abnormal; Notable for the following components:    Ethanol Level 67.0 (*)     All other components within normal limits   ACETAMINOPHEN LEVEL - Abnormal; Notable for the following components:    Acetaminophen Level <17.4 (*)     All other components within normal limits   TSH - Normal   URINALYSIS, REFLEX TO URINE CULTURE - Normal   SARS-COV-2 RNA AMPLIFICATION, QUAL - Normal    Narrative:     The IDNOW COVID-19 assay is a rapid molecular in vitro diagnostic test utilizing an isothermal nucleic acid amplification technology intended for the qualitative detection of nucleic acid from the SARS-CoV-2 viral RNA in direct nasal, nasopharyngeal or throat swabs from individuals who are suspected of COVID-19 by their healthcare provider.   URINALYSIS, MICROSCOPIC - Normal   CBC W/ AUTO DIFFERENTIAL    Narrative:     The following orders were created for panel order CBC auto differential.  Procedure                               Abnormality         Status                     ---------                               -----------         ------                     CBC with Differential[[id number]]                            Final result                 Please view results for these tests on the individual orders.   COMPREHENSIVE METABOLIC PANEL   CBC WITH DIFFERENTIAL          Imaging Results    None          Medications - No data to display  Medical Decision Making:   History:   Old Medical Records: I decided to obtain old medical records.  Initial Assessment:   Patient agitated, restless on stretcher admits to methamphetamine abuse  Differential Diagnosis:   Acute psychosis, meth use, SI, HI, AVH, bipolar, schizophrenia  Clinical Tests:   Lab Tests: Reviewed and Ordered  ED Management:  Patient awake and alert.  Unfortunately very agitated, tremulous, I believe him to be gravely disabled.  This may be all secondary to his methamphetamine abuse but can not be sure.  Denies any SI HI but once again  patient appears to be altered, psychotic.  He is PEC'd will be sent to a psychiatric facility for inpatient evaluation and treatment.  Unremarkable emergency department stay otherwise.        Scribe Attestation:   Scribe #1: I performed the above scribed service and the documentation accurately describes the services I performed. I attest to the accuracy of the note.    Attending Attestation:           Physician Attestation for Scribe:  Physician Attestation Statement for Scribe #1: I, Manpreet Boswell MD, reviewed documentation, as scribed by Nilsa Patel in my presence, and it is both accurate and complete.              Medically cleared for psychiatry placement: 12/15/2022  7:11 PM         Clinical Impression:   Final diagnoses:  [F15.10] Methamphetamine abuse  [Z00.8] Medical clearance for psychiatric admission  [F19.10] Substance abuse  [F23] Acute psychosis (Primary)        ED Disposition Condition    Transfer to Psych Facility Stable          ED Prescriptions    None       Follow-up Information    None          Manpreet Boswell MD  12/15/22 1952

## 2022-12-16 ENCOUNTER — HOSPITAL ENCOUNTER (INPATIENT)
Facility: HOSPITAL | Age: 37
LOS: 7 days | Discharge: HOME | DRG: 885 | End: 2022-12-23
Attending: PSYCHIATRY & NEUROLOGY | Admitting: PSYCHIATRY & NEUROLOGY
Payer: MEDICAID

## 2022-12-16 DIAGNOSIS — F25.9 SCHIZOAFFECTIVE DISORDER: ICD-10-CM

## 2022-12-16 PROBLEM — F25.0 SCHIZOAFFECTIVE DISORDER, BIPOLAR TYPE: Status: ACTIVE | Noted: 2022-12-16

## 2022-12-16 PROBLEM — R53.83 FATIGUE: Status: RESOLVED | Noted: 2019-09-06 | Resolved: 2022-12-16

## 2022-12-16 PROBLEM — F32.3 MAJOR DEPRESSION WITH PSYCHOTIC FEATURES: Status: RESOLVED | Noted: 2021-02-10 | Resolved: 2022-12-16

## 2022-12-16 PROBLEM — F29 PSYCHOSIS: Status: RESOLVED | Noted: 2019-09-06 | Resolved: 2022-12-16

## 2022-12-16 PROBLEM — Z72.0 TOBACCO ABUSE: Status: ACTIVE | Noted: 2022-12-16

## 2022-12-16 PROBLEM — F15.10 METHAMPHETAMINE ABUSE: Status: ACTIVE | Noted: 2022-12-16

## 2022-12-16 PROBLEM — R46.2 BIZARRE BEHAVIOR: Status: RESOLVED | Noted: 2022-12-16 | Resolved: 2022-12-16

## 2022-12-16 PROBLEM — R46.2 BIZARRE BEHAVIOR: Status: ACTIVE | Noted: 2022-12-16

## 2022-12-16 LAB
CHOLEST SERPL-MCNC: 116 MG/DL
CHOLEST/HDLC SERPL: 2 {RATIO} (ref 0–5)
GLUCOSE P FAST SERPL-MCNC: 95 MG/DL (ref 74–100)
HDLC SERPL-MCNC: 52 MG/DL (ref 35–60)
LDLC SERPL CALC-MCNC: 46 MG/DL (ref 50–140)
T PALLIDUM AB SER QL: NONREACTIVE
TRIGL SERPL-MCNC: 92 MG/DL (ref 34–140)
VLDLC SERPL CALC-MCNC: 18 MG/DL

## 2022-12-16 PROCEDURE — 36415 COLL VENOUS BLD VENIPUNCTURE: CPT | Performed by: PSYCHIATRY & NEUROLOGY

## 2022-12-16 PROCEDURE — 80061 LIPID PANEL: CPT | Performed by: PSYCHIATRY & NEUROLOGY

## 2022-12-16 PROCEDURE — 63600175 PHARM REV CODE 636 W HCPCS: Mod: JG | Performed by: PSYCHIATRY & NEUROLOGY

## 2022-12-16 PROCEDURE — 25000003 PHARM REV CODE 250: Performed by: PSYCHIATRY & NEUROLOGY

## 2022-12-16 PROCEDURE — 86780 TREPONEMA PALLIDUM: CPT | Performed by: PSYCHIATRY & NEUROLOGY

## 2022-12-16 PROCEDURE — 82947 ASSAY GLUCOSE BLOOD QUANT: CPT | Performed by: PSYCHIATRY & NEUROLOGY

## 2022-12-16 PROCEDURE — 11400000 HC PSYCH PRIVATE ROOM

## 2022-12-16 RX ORDER — MAG HYDROX/ALUMINUM HYD/SIMETH 200-200-20
30 SUSPENSION, ORAL (FINAL DOSE FORM) ORAL EVERY 6 HOURS PRN
Status: DISCONTINUED | OUTPATIENT
Start: 2022-12-16 | End: 2022-12-23 | Stop reason: HOSPADM

## 2022-12-16 RX ORDER — IBUPROFEN 400 MG/1
400 TABLET ORAL EVERY 6 HOURS PRN
Status: DISCONTINUED | OUTPATIENT
Start: 2022-12-16 | End: 2022-12-23 | Stop reason: HOSPADM

## 2022-12-16 RX ORDER — ACETAMINOPHEN 325 MG/1
650 TABLET ORAL EVERY 6 HOURS PRN
Status: DISCONTINUED | OUTPATIENT
Start: 2022-12-16 | End: 2022-12-23 | Stop reason: HOSPADM

## 2022-12-16 RX ORDER — HYDROXYZINE PAMOATE 50 MG/1
50 CAPSULE ORAL EVERY 6 HOURS PRN
Status: DISCONTINUED | OUTPATIENT
Start: 2022-12-16 | End: 2022-12-23 | Stop reason: HOSPADM

## 2022-12-16 RX ORDER — MULTIVITAMIN
1 TABLET ORAL
Status: ON HOLD | COMMUNITY
Start: 2022-11-15 | End: 2022-12-16

## 2022-12-16 RX ORDER — LORAZEPAM 0.5 MG/1
0.5 TABLET ORAL 3 TIMES DAILY
Status: COMPLETED | OUTPATIENT
Start: 2022-12-16 | End: 2022-12-18

## 2022-12-16 RX ORDER — HALOPERIDOL 5 MG/1
5 TABLET ORAL 2 TIMES DAILY
Status: ON HOLD | COMMUNITY
Start: 2022-12-06 | End: 2022-12-16

## 2022-12-16 RX ORDER — BENZTROPINE MESYLATE 1 MG/1
1 TABLET ORAL 2 TIMES DAILY
Status: ON HOLD | COMMUNITY
Start: 2022-12-06 | End: 2022-12-16

## 2022-12-16 RX ORDER — DIVALPROEX SODIUM 500 MG/1
500 TABLET, DELAYED RELEASE ORAL 2 TIMES DAILY
Status: DISCONTINUED | OUTPATIENT
Start: 2022-12-16 | End: 2022-12-20

## 2022-12-16 RX ORDER — MUPIROCIN 20 MG/G
OINTMENT TOPICAL 2 TIMES DAILY
Status: DISCONTINUED | OUTPATIENT
Start: 2022-12-16 | End: 2022-12-16

## 2022-12-16 RX ORDER — CYANOCOBALAMIN (VITAMIN B-12) 500 MCG
TABLET ORAL
Status: ON HOLD | COMMUNITY
Start: 2022-11-15 | End: 2022-12-16

## 2022-12-16 RX ORDER — FLUOXETINE HYDROCHLORIDE 20 MG/1
20 CAPSULE ORAL
Status: ON HOLD | COMMUNITY
Start: 2022-11-15 | End: 2022-12-16

## 2022-12-16 RX ORDER — FOLIC ACID 1 MG/1
1000 TABLET ORAL
Status: ON HOLD | COMMUNITY
Start: 2022-11-15 | End: 2022-12-16

## 2022-12-16 RX ORDER — OLANZAPINE 5 MG/1
5 TABLET ORAL NIGHTLY
Status: ON HOLD | COMMUNITY
Start: 2022-11-15 | End: 2022-12-16

## 2022-12-16 RX ORDER — IBUPROFEN 200 MG
1 TABLET ORAL DAILY
Status: DISCONTINUED | OUTPATIENT
Start: 2022-12-16 | End: 2022-12-23 | Stop reason: HOSPADM

## 2022-12-16 RX ORDER — MIRTAZAPINE 15 MG/1
15 TABLET, FILM COATED ORAL NIGHTLY
Status: DISCONTINUED | OUTPATIENT
Start: 2022-12-16 | End: 2022-12-20

## 2022-12-16 RX ORDER — ADHESIVE BANDAGE
30 BANDAGE TOPICAL DAILY PRN
Status: DISCONTINUED | OUTPATIENT
Start: 2022-12-16 | End: 2022-12-23 | Stop reason: HOSPADM

## 2022-12-16 RX ADMIN — MIRTAZAPINE 15 MG: 15 TABLET, FILM COATED ORAL at 08:12

## 2022-12-16 RX ADMIN — LORAZEPAM 0.5 MG: 0.5 TABLET ORAL at 08:12

## 2022-12-16 RX ADMIN — DIVALPROEX SODIUM 500 MG: 500 TABLET, DELAYED RELEASE ORAL at 08:12

## 2022-12-16 RX ADMIN — LORAZEPAM 0.5 MG: 0.5 TABLET ORAL at 03:12

## 2022-12-16 RX ADMIN — PALIPERIDONE PALMITATE 234 MG: 234 INJECTION INTRAMUSCULAR at 04:12

## 2022-12-16 NOTE — HOSPITAL COURSE
12/16/22-The patient was admitted to the psychiatric unit and monitored for safety. The medications were reconciled. A history and physical was completed by the primary care doctor and and medical issues were addressed. The patient was provided with individual and group therapy. He agrees to start Invega Sustenna injection as he was on it in the past along with depakote and remeron. Will also give  three days of Ativavn for anxiety and restlessness.    12/19/22-He is completely disheveled and malodorous. Poor eye contact and minimal self disclosure. He came out of the room for the first time yesterday. He denied any psychosis but this is questionable. He appears guarded and fearful/nontrusting. He is extremely thin. He is isolative and withdrawn. He has been compliant with medications thus far. He denied any SI/HI/death wishes. Staff did encourage him to shower. We will continue to encourage and look for adequate placement.     12/20/22-He signed FVA. He refused Depakote due to restless legs. He is not participating in groups. Sleeping well and stays in bed. Appetite is good. He is disheveled and malodorous. He denies hallucinations or delusions but still presents as psychotic. Thoughts are more clear, however, and much more logical and coherent. He has poor insight and judgment. Denies SI/HI. He wants to go to a program or rehab. He may contact father to see about going home but he is reluctant to do this at this time. He tolerated Invega sustenna injection and will get second shot tomorrow. Discontinue Depakote per patient request. He agrees to increase Remeron and add Cogentin for restless legs.     12/21/22-Mood is good. No agitation. He got second Invega Sustenna injection. No side effects. Sleeping a lot. Denies active hallucinations or delusions. Thoughts are more clear and he answers questions more appropriately. No si/hi. He was not accepted to shelter. Trying to get in touch with father. Will monitor  patient and work on safe discharge plan.    12/22/22-Today he presents with some improvement. Denies any psychosis. Sleeping better. Remains guarded; poor eye contact. Starting showering but still has not brushed his hair. He stated that he would be interested in going [place] and continuing with the residential program that they have there. Tolerated the second Invega injection and willing to continue with it in the future. Will discharge once accepted into the rehab.   12/22/22-Addendum: Patient was accepted into rehab this afternoon. Discharge will be scheduled for tomorrow.    Psychiatric Mental Status Exam:  General Appearance: appears stated age, well-developed, well-nourished  Arousal: alert  Behavior: cooperative  Movements and Motor Activity: no abnormal involuntary movements noted  Orientation: oriented to person, place, time, and situation  Speech: normal rate, normal rhythm, normal volume, normal tone  Mood: improved  Affect: mood-congruent  Thought Process: linear  Associations: intact  Thought Content and Perceptions: no suicidal ideation, no homicidal ideation, no auditory hallucinations, no visual hallucinations, no paranoid ideation, no ideas of reference  Recent and Remote Memory: recent memory intact, remote memory intact  Attention and Concentration: intact, attentive to conversation  Fund of Knowledge: intact, aware of current events, vocabulary appropriate  Insight: intact  Judgment: intact

## 2022-12-16 NOTE — NURSING
"John is a 37 year old WM admitted to the services of Dr. Gonzales at [facility] from [facility] ER. Admitted with the provisional dx of Schizoaffective disorder. Arrived via [facility] with signed PEC. Independent with ADL's. States he lives alone in [facility] but was walking in [place] and stays at [facility]. He could not elaborate if this a a group home, sober living, shelter or someone's house. He states he is unemployed, no income. He completed 10 grade and has no  history.     He states he was walking in [place]. EMS was called by some bystanders stating he was hitting his head on the wall. He states to ED staff that he "got into some stuff." Although he tries to cooperate, his concentration and memory is poor and cannot understand most of the questions in the interview. He is tweaking and cannot keep still, moving his head, arms and legs in jerking motions. He states his appetite is good. He slept most of the time in the ED but stated had not slept in 2 days prior to going to the hospital. He states he has had the covid and flu vaccines. He is a poor historian and at one point he was answering yes to every question.     UDS: amphetamines He admits to using prior to ER.   Allergies NKDA  Psych HX: prior IP stays but unable to elaborate  Medical Hx: Denies  Surgical Hx: denies  MMSE:  17   Jair:  22  FRA: 76    Dietary notified. Name placed on board for medical doctor for H&P.  notified. Body searched, belongings inventoried, clothes washed, and he ate 2 sandwiches and drank Sprite. Noted his feet were dirty, black and his toes were red. He stated he was wearing sandals. He is disheveled, unkempt, with body odor. He was oriented to unit, staff, room, and routine. He declined to notify any one of his admission. He was shown to his room and he went to bed. Will closely monitor and provide emotional support. Q15min safety checks.  "

## 2022-12-16 NOTE — NURSING
John is withdraw and avoidant. Bizarre and disheveled in appearence. Repetitive movements, picking at sores on his arm and in his nose. Delayed thoughts and speech. Denies depression, suicidal thoughts or past SA. Drinks 2x a week. Admits to past psyc history and having been on Invega injections years ago. Pt continuously yawning and also complaining of nausea. Met with Dr. Gonzales, medications reconciled. Q 15 min safety checks with fall precautions. Will monitor mood and behavior and offer emotional support.

## 2022-12-16 NOTE — ASSESSMENT & PLAN NOTE
He agrees to start Invega Sustenna injection as he was on it in the past along with depakote and remeron. Will also give  three days of Ativavn for anxiety and restlessness.

## 2022-12-16 NOTE — H&P
[facility] Behavioral Health Unit  Psychiatry  History & Physical    Patient Name: John Lopez Jr.  MRN: [id number]   Code Status: Full Code  Admission Date: 12/16/2022  Attending Physician: Hemal Gonzales MD   Primary Care Provider: Provider [facility]    Current Legal Status: [facility]    Patient information was obtained from patient.     Subjective:     Principal Problem:Schizoaffective disorder, bipolar type    Chief Complaint:  I don't know what happended.     HPI: 37-year-old male who is from [place], [place] and has been staying at  shelter in [place], [place] for a few months. Bystanders called 911 because he was walking around and banging his head on a wall. He was using methamphetamine. UDS positive for amphetamine and alcohol level was 67. He drinks about a 6 pack a few times a week. He has been anxious and depressed. No recent SI/HI. He denies hallucinations. He has a history of paranoia but denies this recently. Sleep is fair and appetite is ok. He is restless today. Cooperative and thoughts a little scattered at times. He does have past psych admits and a history of bipolar disorder and possibly schizophrenia. He reports being on meds recently.            Patient was seen via video telemedicine and consented to the interview. The patient was located in [place], [place] and the provider was located in [place], [place].    Psychiatric Mental Status Exam:  General Appearance: disheveled  Arousal: alert  Behavior: cooperative  Movements and Motor Activity: restless, psychomotor agitation  Orientation: oriented to person, place, time, and situation  Speech: normal rate, normal rhythm, normal volume, normal tone  Mood: anxious  Affect: mood-congruent  Thought Process: linear  Associations: intact  Thought Content and Perceptions: no suicidal ideation, no homicidal ideation, no auditory hallucinations, no visual hallucinations, no paranoid ideation, no ideas of reference  Recent and Remote Memory: recent memory  intact, remote memory intact  Attention and Concentration: distracted  Fund of Knowledge: intact, aware of current events, vocabulary appropriate  Insight: limited  Judgment: limited           Patient History               Medical as of 12/16/2022    Past Medical History: Patient provided no pertinent medical history.               Surgical as of 12/16/2022    Past Surgical History: Patient provided no pertinent surgical history.               Family as of 12/16/2022    None               Tobacco Use as of 12/16/2022       Smoking Status Smoking Start Date Last Attempt to Quit Smoking Frequency    Every Day -- -- 1.00 packs/day      Smokeless Status Smokeless Type Smokeless Quit Date    Never -- --      Source    Provider                  Alcohol Use as of 12/16/2022       Alcohol Use Drinks/Week Alcohol/Week Comments Source    --   -- -- Provider                  Drug Use as of 12/16/2022       Drug Use Types Frequency Comments Source    Not Currently -- -- -- Provider                  Sexual Activity as of 12/16/2022       Sexually Active Birth Control Partners Comments Source    Not Currently -- -- -- Provider                  Activities of Daily Living as of 12/16/2022       Activities of Daily Living Question Response Comments Source    Patient feels they ought to cut down on drinking/drug use No -- Provider    Patient annoyed by others criticizing their drinking/drug use No -- Provider    Patient has felt bad or guilty about drinking/drug use No -- Provider    Patient has had a drink/used drugs as an eye opener in the AM No -- Provider                  Social Documentation as of 12/16/2022    None               Occupational as of 12/16/2022    None               Socioeconomic as of 12/16/2022       Marital Status Spouse Name Number of Children Years Education Education Level Preferred Language Ethnicity Race Source    Single -- -- -- -- English Not  or /a White --                  Pertinent  History       Question Response Comments    Lives with -- --    Place in Birth Order -- --    Lives in -- --    Number of Siblings -- --    Raised by -- --    Legal Involvement -- --    Childhood Trauma -- --    Criminal History of -- --    Financial Status -- --    Highest Level of Education -- --    Does patient have access to a firearm? -- --     Service -- --    Primary Leisure Activity -- --    Spirituality -- --          No past medical history on file.  No past surgical history on file.  Family History    None       Tobacco Use    Smoking status: Every Day     Packs/day: 1.00     Types: Cigarettes    Smokeless tobacco: Never   Substance and Sexual Activity    Alcohol use: Not on file    Drug use: Not Currently    Sexual activity: Not Currently     Review of patient's allergies indicates:  No Known Allergies    No current facility-administered medications on file prior to encounter.     Current Outpatient Medications on File Prior to Encounter   Medication Sig    benztropine (COGENTIN) 1 MG tablet Take 1 mg by mouth 2 (two) times daily.    divalproex (DEPAKOTE) 500 MG TbEC Take 1 tablet (500 mg total) by mouth every 12 (twelve) hours.    EScitalopram oxalate (LEXAPRO) 20 MG tablet Take 1 tablet (20 mg total) by mouth every evening.    FLUoxetine 20 MG capsule Take 20 mg by mouth.    folic acid (FOLVITE) 1 MG tablet Take 1,000 mcg by mouth.    haloperidoL (HALDOL) 5 MG tablet Take 5 mg by mouth 2 (two) times daily.    mirtazapine (REMERON) 15 MG tablet Take 1 tablet (15 mg total) by mouth every evening.    multivitamin (THERAGRAN) per tablet Take 1 tablet by mouth.    OLANZapine (ZYPREXA) 5 MG tablet Take 5 mg by mouth every evening.    paliperidone palmitate (INVEGA SUSTENNA) 156 mg/mL Syrg injection Inject 1 mL (156 mg total) into the muscle every 28 days.    VITAMIN B-12 500 MCG tablet SMARTSI MCG By Mouth Daily     Psychotherapeutics (From admission, onward)      None       "    Review of Systems  Strengths and Liabilities: Strength: Patient is expressive/articulate., Liability: Patient has poor judgment    Objective:     Vital Signs (Most Recent):  Temp: 97.8 °F (36.6 °C) (12/16/22 0730)  Pulse: 86 (12/16/22 0730)  Resp: 18 (12/16/22 0730)  BP: 114/63 (12/16/22 0730)  SpO2: 98 % (12/16/22 0730) Vital Signs (24h Range):  Temp:  [97.5 °F (36.4 °C)-98.1 °F (36.7 °C)] 97.8 °F (36.6 °C)  Pulse:  [] 86  Resp:  [18] 18  SpO2:  [97 %-100 %] 98 %  BP: (114-123)/(63-87) 114/63     Height: 6' 2" (188 cm)  Weight: 66.3 kg (146 lb 2.6 oz)  Body mass index is 18.77 kg/m².    No intake or output data in the 24 hours ending 12/16/22 1453    Physical Exam     Significant Labs: Last 72 Hours:   Recent Lab Results         12/16/22  0522   12/16/22  0515   12/15/22  1725   12/15/22  1721        Phencyclidine     Negative         Albumin/Globulin Ratio       1.3       Acetaminophen (Tylenol), Serum       <17.4       Albumin       3.9       Alcohol, Serum       67.0       Alkaline Phosphatase       112       ALT       20       Amphetamine Screen, Ur     Positive         Appearance, UA     Clear         AST       29       Bacteria, UA     None Seen         Barbiturate Screen, Ur     Negative         Baso #       0.04       Basophil %       0.5       Benzodiazepine Screen, Urine     Negative         BILIRUBIN TOTAL       0.5       Bilirubin, UA     Negative         BUN       10.7       Calcium       9.6       Cannabinoids, Urine     Negative         Chloride       105       Cholesterol   116           CO2       22       Cocaine (Metab.)     Negative         Color, UA     Yellow         ID NOW COVID-19, (CELIA)     Negative         Creatinine       0.98       eGFR       >60       Eos #       0.08       Eosinophil %       1.0       Fentanyl, Urine     Negative         Globulin, Total       2.9       Gluc Fast   95           Glucose       82       Glucose, UA     Negative         HDL   52           " HEMATOCRIT       42.7       HEMOGLOBIN       14.8       Immature Grans (Abs)       0.02       Immature Granulocytes       0.2       Ketones, UA     Negative         LDL Cholesterol External   46.00           Leukocytes, UA     Negative         Lymph #       2.40       LYMPH %       29.6       MCH       31.4       MCHC       34.7       MCV       90.7       MDMA, Urine     Negative         Mono #       1.18       Mono %       14.5       MPV       11.2       Neut #       4.40       Neut %       54.2       NITRITE UA     Negative         nRBC       0.0       Occult Blood UA     Negative         Opiate Scrn, Ur     Negative         pH, UA     5.5         pH, Urine     5.5         Platelets       242       Potassium       4.1       PROTEIN TOTAL       6.8       Protein, UA     Negative         RBC       4.71       RBC, UA     <5         RDW       13.6       Sodium       140       Specific Gravity,UA     1.004         Squam Epithel, UA     <5         Syphilis Antibody Nonreactive             Thyroid Stimulating Hormone       1.016       Total Cholesterol/HDL Ratio   2           Triglycerides   92           Urobilinogen, UA     0.2         Very Low Density Lipoprotein   18           WBC, UA     <5         WBC       8.1               Significant Imaging: I have reviewed all pertinent imaging results/findings within the past 24 hours.    Assessment/Plan:     * Schizoaffective disorder, bipolar type  He agrees to start Invega Sustenna injection as he was on it in the past along with depakote and remeron. Will also give  three days of Ativavn for anxiety and restlessness.        Estimated Discharge Date:   Initial Discharge Plan: Residential Placement    Prognosis: Fair    Need for Continued Hospitalization:   Psychiatric illness continues to pose a potential threat to life or bodily function, of self or others, thereby requiring the need for continued inpatient psychiatric hospitalization., Protective inpatient psychiatric  hospitalization required while a safe disposition plan is enacted. and Requires ongoing hospitalization for stabilization of medications.    Total Time: 30 with greater than 50% of time spent in counseling and/or coordination of care.     Hemal Gonzales MD   Psychiatry  [facility]

## 2022-12-16 NOTE — PSYCH EVALUATION
"Behavioral Health Unit  Psychosocial History and Assessment  Progress Note      Patient Name: John Lopez Jr. YOB: 1985 [facility] Date: 12/16/2022    Chief Complaint: addictive disorder and psychosis    Consent:     Did the patient consent for an interview with the ? Yes    Did the patient consent for the  to contact family/friend/caregiver?   No    Did the patient give consent for the  to inform family/friend/caregiver of his/her whereabouts or to discuss discharge planning? No    Source of Information: Face to face with patient and Chart review    Is information obtained from interviews considered reliable?   yes    Reason for Admission:     There are no hospital problems to display for this patient.      History of Present Illness - (Patient Perception):   John is a 37 year old WM [facility]. Admitted with the provisional dx of Schizoaffective disorder. Arrived via [facility] with signed PEC. Independent with ADL's. States he lives alone in [facility] but was walking in [place] and stays at [facility]. He could not elaborate if this a a group home, sober living, shelter or someone's house. He states he is unemployed, no income. He completed 10 grade and has no  history.      He states he was walking in [place]. EMS was called by some bystanders stating he was hitting his head on the wall. He states to ED staff that he "got into some stuff." Although he tries to cooperate, his concentration and memory is poor and cannot understand most of the questions in the interview. He is tweaking and cannot keep still, moving his head, arms and legs in jerking motions. He states his appetite is good. He slept most of the time in the ED but stated had not slept in 2 days prior to going to the hospital. He states he has had the covid and flu vaccines. He is a poor historian and at one point he " was answering yes to every question.   He did finally indicate that he was kicked out of [facility] as he had some marijuana one week ago.  He cannot go back for 3 weeks.  Pt did also say that he has had previous admits to psych facilities and rehabs.    History of Present Illness - (Perception of Others):  decliined    Present biopsychosocial functioning: low    Past biopsychosocial functioning: Pt has a history of higher function    Family and Marital/Relationship History:     Significant Other/Partner Relationships:  Single:  never /no children    Family Relationships: Strained      Childhood History:     Where was patient raised? [facility]    Who raised the patient? parents      How does patient describe their childhood? OK      Who is patient's primary support person? brother      Culture and Protestant:     Protestant: Catholic    How strong of a role does Holiness and spirituality play in patient's life? none    Adventism or spiritual concerns regarding treatment: not applicable     History of Abuse:   History of Abuse: Denies      Outcome:     Psychiatric and Medical History:     History of psychiatric illness or treatment: prior inpatient treatment, has participated in counseling/psychotherapy on an outpatient basis in the past, and currently under psychiatric care    Medical history: No past medical history on file.    Substance Abuse History:     Alcohol - (Patient Perspective):   Social History     Substance and Sexual Activity   Alcohol Use None       Alcohol - (Collateral Perspective): none according to patient    Drugs - (Patient Perspective):   Social History     Substance and Sexual Activity   Drug Use Not Currently       Drugs - (Collateral Perspective): meth for one week according to patient    Additional Comments: Pt states he has ahistory of drug use.  Was living at the [place] [place] and working with [employer]    Education:     Currently Enrolled? No  High School (9-12) or GED    Special  Education? No    Interested in Completing Education/GED: No    Employment and Financial:     Currently employed? Unemployed Reason for unemployment: homeless, drug use.  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 No    Who manages finances/personal affairs? patient      Service:     ? no    Combat Service? No     Community Resources:     Describe present use of community resources: [facility]     Identify previously used community resources   (Include previous mental health treatment - outpatient and inpatient): Pt has a history of multiple psych admits due to drugs    Environmental:     Current living situation:homeless    Social Evaluation:     Patient Assets: Work skills and Physical health    Patient Limitations: drug use/ poor coping skills, med non compliant    High risk psychosocial issues that may impact discharge planning:   homeless    Recommendations:     Anticipated discharge plan:   Rehab?    High risk issues requiring early treatment planning and immediate intervention: homeless    Community resources needed for discharge planning:  housing and aftercare treatment sources    Anticipated social work role(s) in treatment and discharge planning:  will offer advice and  as well as group therapy 4x per week.   will assist with discharge planning and aftercare resources.

## 2022-12-16 NOTE — HPI
38 yo male admitted to [facility] for bizarre behavior.  He was also abusing Meth as well.  Hospitalist have been consulted for medical evaluation.  He denies any N/V/D/C.  No chest pain/SOB.  No fever/chills.  He still seems to be under the influence and not really acting appropriately.

## 2022-12-16 NOTE — SUBJECTIVE & OBJECTIVE
Patient was seen via video telemedicine and consented to the interview. The patient was located in [place], [place] and the provider was located in [place], [place].    Psychiatric Mental Status Exam:  General Appearance: disheveled  Arousal: alert  Behavior: cooperative  Movements and Motor Activity: restless, psychomotor agitation  Orientation: oriented to person, place, time, and situation  Speech: normal rate, normal rhythm, normal volume, normal tone  Mood: anxious  Affect: mood-congruent  Thought Process: linear  Associations: intact  Thought Content and Perceptions: no suicidal ideation, no homicidal ideation, no auditory hallucinations, no visual hallucinations, no paranoid ideation, no ideas of reference  Recent and Remote Memory: recent memory intact, remote memory intact  Attention and Concentration: distracted  Fund of Knowledge: intact, aware of current events, vocabulary appropriate  Insight: limited  Judgment: limited           Patient History               Medical as of 12/16/2022    Past Medical History: Patient provided no pertinent medical history.               Surgical as of 12/16/2022    Past Surgical History: Patient provided no pertinent surgical history.               Family as of 12/16/2022    None               Tobacco Use as of 12/16/2022       Smoking Status Smoking Start Date Last Attempt to Quit Smoking Frequency    Every Day -- -- 1.00 packs/day      Smokeless Status Smokeless Type Smokeless Quit Date    Never -- --      Source    Provider                  Alcohol Use as of 12/16/2022       Alcohol Use Drinks/Week Alcohol/Week Comments Source    --   -- -- Provider                  Drug Use as of 12/16/2022       Drug Use Types Frequency Comments Source    Not Currently -- -- -- Provider                  Sexual Activity as of 12/16/2022       Sexually Active Birth Control Partners Comments Source    Not Currently -- -- -- Provider                  Activities of Daily Living as of 12/16/2022        Activities of Daily Living Question Response Comments Source    Patient feels they ought to cut down on drinking/drug use No -- Provider    Patient annoyed by others criticizing their drinking/drug use No -- Provider    Patient has felt bad or guilty about drinking/drug use No -- Provider    Patient has had a drink/used drugs as an eye opener in the AM No -- Provider                  Social Documentation as of 12/16/2022    None               Occupational as of 12/16/2022    None               Socioeconomic as of 12/16/2022       Marital Status Spouse Name Number of Children Years Education Education Level Preferred Language Ethnicity Race Source    Single -- -- -- -- English Not  or /a White --                  Pertinent History       Question Response Comments    Lives with -- --    Place in Birth Order -- --    Lives in -- --    Number of Siblings -- --    Raised by -- --    Legal Involvement -- --    Childhood Trauma -- --    Criminal History of -- --    Financial Status -- --    Highest Level of Education -- --    Does patient have access to a firearm? -- --     Service -- --    Primary Leisure Activity -- --    Spirituality -- --          No past medical history on file.  No past surgical history on file.  Family History    None       Tobacco Use    Smoking status: Every Day     Packs/day: 1.00     Types: Cigarettes    Smokeless tobacco: Never   Substance and Sexual Activity    Alcohol use: Not on file    Drug use: Not Currently    Sexual activity: Not Currently     Review of patient's allergies indicates:  No Known Allergies    No current facility-administered medications on file prior to encounter.     Current Outpatient Medications on File Prior to Encounter   Medication Sig    benztropine (COGENTIN) 1 MG tablet Take 1 mg by mouth 2 (two) times daily.    divalproex (DEPAKOTE) 500 MG TbEC Take 1 tablet (500 mg total) by mouth every 12 (twelve) hours.    EScitalopram oxalate  "(LEXAPRO) 20 MG tablet Take 1 tablet (20 mg total) by mouth every evening.    FLUoxetine 20 MG capsule Take 20 mg by mouth.    folic acid (FOLVITE) 1 MG tablet Take 1,000 mcg by mouth.    haloperidoL (HALDOL) 5 MG tablet Take 5 mg by mouth 2 (two) times daily.    mirtazapine (REMERON) 15 MG tablet Take 1 tablet (15 mg total) by mouth every evening.    multivitamin (THERAGRAN) per tablet Take 1 tablet by mouth.    OLANZapine (ZYPREXA) 5 MG tablet Take 5 mg by mouth every evening.    paliperidone palmitate (INVEGA SUSTENNA) 156 mg/mL Syrg injection Inject 1 mL (156 mg total) into the muscle every 28 days.    VITAMIN B-12 500 MCG tablet SMARTSI MCG By Mouth Daily     Psychotherapeutics (From admission, onward)      None          Review of Systems  Strengths and Liabilities: Strength: Patient is expressive/articulate., Liability: Patient has poor judgment    Objective:     Vital Signs (Most Recent):  Temp: 97.8 °F (36.6 °C) (22)  Pulse: 86 (22)  Resp: 18 (22)  BP: 114/63 (22)  SpO2: 98 % (22) Vital Signs (24h Range):  Temp:  [97.5 °F (36.4 °C)-98.1 °F (36.7 °C)] 97.8 °F (36.6 °C)  Pulse:  [] 86  Resp:  [18] 18  SpO2:  [97 %-100 %] 98 %  BP: (114-123)/(63-87) 114/63     Height: 6' 2" (188 cm)  Weight: 66.3 kg (146 lb 2.6 oz)  Body mass index is 18.77 kg/m².    No intake or output data in the 24 hours ending 22 1453    Physical Exam     Significant Labs: Last 72 Hours:   Recent Lab Results         22  0522   22  0515   12/15/22  1725   12/15/22  1721        Phencyclidine     Negative         Albumin/Globulin Ratio       1.3       Acetaminophen (Tylenol), Serum       <17.4       Albumin       3.9       Alcohol, Serum       67.0       Alkaline Phosphatase       112       ALT       20       Amphetamine Screen, Ur     Positive         Appearance, UA     Clear         AST       29       Bacteria, UA     None Seen         Barbiturate Screen, Ur "     Negative         Baso #       0.04       Basophil %       0.5       Benzodiazepine Screen, Urine     Negative         BILIRUBIN TOTAL       0.5       Bilirubin, UA     Negative         BUN       10.7       Calcium       9.6       Cannabinoids, Urine     Negative         Chloride       105       Cholesterol   116           CO2       22       Cocaine (Metab.)     Negative         Color, UA     Yellow         ID NOW COVID-19, ([facility]ELIA)     Negative         Creatinine       0.98       eGFR       >60       Eos #       0.08       Eosinophil %       1.0       Fentanyl, Urine     Negative         Globulin, Total       2.9       Gluc Fast   95           Glucose       82       Glucose, UA     Negative         HDL   52           HEMATOCRIT       42.7       HEMOGLOBIN       14.8       Immature Grans (Abs)       0.02       Immature Granulocytes       0.2       Ketones, UA     Negative         LDL Cholesterol External   46.00           Leukocytes, UA     Negative         Lymph #       2.40       LYMPH %       29.6       MCH       31.4       MCHC       34.7       MCV       90.7       MDMA, Urine     Negative         Mono #       1.18       Mono %       14.5       MPV       11.2       Neut #       4.40       Neut %       54.2       NITRITE UA     Negative         nRBC       0.0       Occult Blood UA     Negative         Opiate Scrn, Ur     Negative         pH, UA     5.5         pH, Urine     5.5         Platelets       242       Potassium       4.1       PROTEIN TOTAL       6.8       Protein, UA     Negative         RBC       4.71       RBC, UA     <5         RDW       13.6       Sodium       140       Specific Gravity,UA     1.004         Squam Epithel, UA     <5         Syphilis Antibody Nonreactive             Thyroid Stimulating Hormone       1.016       Total Cholesterol/HDL Ratio   2           Triglycerides   92           Urobilinogen, UA     0.2         Very Low Density Lipoprotein   18           WBC, UA     <5         WBC        8.1               Significant Imaging: I have reviewed all pertinent imaging results/findings within the past 24 hours.

## 2022-12-16 NOTE — HPI
37-year-old male who is from [place], [place] and has been staying at  shelter in [place], [place] for a few months. Bystanders called 911 because he was walking around and banging his head on a wall. He was using methamphetamine. UDS positive for amphetamine and alcohol level was 67. He drinks about a 6 pack a few times a week. He has been anxious and depressed. No recent SI/HI. He denies hallucinations. He has a history of paranoia but denies this recently. Sleep is fair and appetite is ok. He is restless today. Cooperative and thoughts a little scattered at times. He does have past psych admits and a history of bipolar disorder and possibly schizophrenia. He reports being on meds recently.

## 2022-12-16 NOTE — SUBJECTIVE & OBJECTIVE
No past medical history on file.    No past surgical history on file.    Review of patient's allergies indicates:  No Known Allergies    No current facility-administered medications on file prior to encounter.     Current Outpatient Medications on File Prior to Encounter   Medication Sig    benztropine (COGENTIN) 1 MG tablet Take 1 mg by mouth 2 (two) times daily.    divalproex (DEPAKOTE) 500 MG TbEC Take 1 tablet (500 mg total) by mouth every 12 (twelve) hours.    EScitalopram oxalate (LEXAPRO) 20 MG tablet Take 1 tablet (20 mg total) by mouth every evening.    FLUoxetine 20 MG capsule Take 20 mg by mouth.    folic acid (FOLVITE) 1 MG tablet Take 1,000 mcg by mouth.    haloperidoL (HALDOL) 5 MG tablet Take 5 mg by mouth 2 (two) times daily.    mirtazapine (REMERON) 15 MG tablet Take 1 tablet (15 mg total) by mouth every evening.    multivitamin (THERAGRAN) per tablet Take 1 tablet by mouth.    OLANZapine (ZYPREXA) 5 MG tablet Take 5 mg by mouth every evening.    paliperidone palmitate (INVEGA SUSTENNA) 156 mg/mL Syrg injection Inject 1 mL (156 mg total) into the muscle every 28 days.    VITAMIN B-12 500 MCG tablet SMARTSI MCG By Mouth Daily     Family History    None       Tobacco Use    Smoking status: Every Day     Packs/day: 1.00     Types: Cigarettes    Smokeless tobacco: Never   Substance and Sexual Activity    Alcohol use: Not on file    Drug use: Not Currently    Sexual activity: Not Currently     Review of Systems   Constitutional: Negative.    HENT: Negative.     Eyes: Negative.    Respiratory: Negative.     Cardiovascular: Negative.    Gastrointestinal: Negative.    Endocrine: Negative.    Genitourinary: Negative.    Musculoskeletal: Negative.    Skin: Negative.    Allergic/Immunologic: Negative.    Neurological: Negative.    Hematological: Negative.    Psychiatric/Behavioral:  Positive for behavioral problems and decreased concentration.    Objective:     Vital Signs (Most Recent):  Temp: 97.8 °F  (36.6 °C) (12/16/22 0730)  Pulse: 86 (12/16/22 0730)  Resp: 18 (12/16/22 0730)  BP: 114/63 (12/16/22 0730)  SpO2: 98 % (12/16/22 0730) Vital Signs (24h Range):  Temp:  [97.5 °F (36.4 °C)-98.1 °F (36.7 °C)] 97.8 °F (36.6 °C)  Pulse:  [] 86  Resp:  [18] 18  SpO2:  [97 %-100 %] 98 %  BP: (114-123)/(63-87) 114/63     Weight: 66.3 kg (146 lb 2.6 oz)  Body mass index is 18.77 kg/m².    Physical Exam  Constitutional:       Appearance: He is normal weight. He is ill-appearing.   HENT:      Head: Normocephalic and atraumatic.      Nose: Nose normal.      Mouth/Throat:      Mouth: Mucous membranes are moist.      Pharynx: Oropharynx is clear.   Eyes:      Extraocular Movements: Extraocular movements intact.      Conjunctiva/sclera: Conjunctivae normal.      Pupils: Pupils are equal, round, and reactive to light.   Cardiovascular:      Rate and Rhythm: Normal rate and regular rhythm.      Pulses: Normal pulses.      Heart sounds: Normal heart sounds.   Pulmonary:      Effort: Pulmonary effort is normal.      Breath sounds: Normal breath sounds.   Abdominal:      General: Abdomen is flat. Bowel sounds are normal.   Musculoskeletal:         General: Normal range of motion.      Cervical back: Normal range of motion and neck supple.   Skin:     General: Skin is warm and dry.      Capillary Refill: Capillary refill takes 2 to 3 seconds.   Neurological:      General: No focal deficit present.      Mental Status: He is alert and oriented to person, place, and time. Mental status is at baseline.   Psychiatric:         Attention and Perception: He is inattentive.         Mood and Affect: Affect is labile.         Speech: He is noncommunicative.         Behavior: Behavior is withdrawn.         Thought Content: Thought content is paranoid and delusional.         Cognition and Memory: Cognition is impaired.         Judgment: Judgment is inappropriate.       Significant Labs: All pertinent labs within the past 24 hours have been  reviewed.  BMP:   Recent Labs   Lab 12/15/22  1721      K 4.1   CO2 22   BUN 10.7   CREATININE 0.98   CALCIUM 9.6     CBC:   Recent Labs   Lab 12/15/22  1721   WBC 8.1   HGB 14.8   HCT 42.7        CMP:   Recent Labs   Lab 12/15/22  1721      K 4.1   CO2 22   BUN 10.7   CREATININE 0.98   CALCIUM 9.6   ALBUMIN 3.9   BILITOT 0.5   ALKPHOS 112   AST 29   ALT 20     Magnesium: No results for input(s): MG in the last 48 hours.    Significant Imaging: I have reviewed all pertinent imaging results/findings within the past 24 hours.

## 2022-12-17 PROCEDURE — 11400000 HC PSYCH PRIVATE ROOM

## 2022-12-17 PROCEDURE — 25000003 PHARM REV CODE 250: Performed by: PSYCHIATRY & NEUROLOGY

## 2022-12-17 RX ADMIN — LORAZEPAM 0.5 MG: 0.5 TABLET ORAL at 08:12

## 2022-12-17 RX ADMIN — LORAZEPAM 0.5 MG: 0.5 TABLET ORAL at 02:12

## 2022-12-17 RX ADMIN — DIVALPROEX SODIUM 500 MG: 500 TABLET, DELAYED RELEASE ORAL at 08:12

## 2022-12-17 RX ADMIN — LORAZEPAM 0.5 MG: 0.5 TABLET ORAL at 09:12

## 2022-12-17 RX ADMIN — MIRTAZAPINE 15 MG: 15 TABLET, FILM COATED ORAL at 08:12

## 2022-12-17 NOTE — NURSING
Flat affect, sad, anxious mood. Isolates and avoids. Up for meals only. Minimal interaction on the unit. Appetite good. Sleeping well. Q15min safety checks.

## 2022-12-17 NOTE — NURSING
John is withdrawn and avoidant. Flat, sad affect. Minimal interaction, up for meals an snacks. Sleeping and eating well. Compliant with medications no adverse effects observed. Denies SI or hallucinations. Will monitor mood and behavior and offer emotional support.

## 2022-12-17 NOTE — GROUP NOTE
Group     Group Focus: Social Interaction      Number of patients in attendance: 3    Group Start Time: 1715  Group End Time:  1745  Groups Date: 12/16/2022  Group Topic:  Behavioral Health  Group Department: [facility]  Group Facilitators:  Jonel Waldrop LPN  _____________________________________________________________________    Patient Name: Jonh Lopez   MRN: [id number]  Patient Class: IP- Psych   Admission Date\Time: 12/16/2022 12:03 AM  Hospital Length of Stay: 0  Primary Care Provider: [facility]     Referred by: Behavioral Medicine Unit Treatment Team     Target symptoms: N/A     Patient's response to treatment: Pt did not attend     Progress toward goals: N/A     Interval History: N/A     Diagnosis:      Plan: Continue treatment on BMU

## 2022-12-17 NOTE — GROUP NOTE
Group Psychotherapy       Group Focus: Promoting Healthy Lifestyles      Number of patients in attendance: 3    Group Start Time: 1945  Group End Time:  2030  Groups Date: 12/16/2022  Group Topic:  Behavioral Health  Group Department: [facility]  Group Facilitators:  Yessy Aguiar RN  _____________________________________________________________________    Patient Name: John Lopez   MRN: [id number]  Patient Class: IP- Psych   Admission Date\Time: 12/16/2022 12:03 AM  Hospital Length of Stay: 0  Primary Care Provider: Provider [facility]     Referred by: Behavioral Medicine Unit Treatment Team     Target symptoms: Substance Abuse     Patient's response to treatment: Not Participating     Progress toward goals: Not progressing     Interval History: invited to but declined to attend group     Diagnosis: schizoaffective disorder     Plan: Continue treatment on BMU

## 2022-12-17 NOTE — GROUP NOTE
Nursing Group       Group Focus: Social Interaction (Movie and Popcorn)      Number of patients in attendance: 3    Group Start Time: 1400  Group End Time:  1430  Groups Date: 12/17/2022  Group Topic:  Behavioral Health  Group Department: [facility]  Group Facilitators:  Jami Anna RN  _____________________________________________________________________    Patient Name: John Lopez   MRN: [id number]  Patient Class: IP- Psych   Admission Date\Time: 12/16/2022 12:03 AM  Hospital Length of Stay: 1  Primary Care Provider: Provider [facility]     Referred by: Behavioral Medicine Unit Treatment Team     Target symptoms: NA     Patient's response to treatment: NA     Progress toward goals: NA     Interval History: Pt did not attend     Diagnosis: SAD     Plan: Continue treatment on BMU

## 2022-12-17 NOTE — GROUP NOTE
Group       Group Focus: Social interaction      Number of patients in attendance: 2    Group Start Time: 1430  Group End Time:  1500  Groups Date: 12/17/2022  Group Topic:  Behavioral Health  Group Department: [facility]  Group Facilitators:  Jonel Waldrop LPN  _____________________________________________________________________    Patient Name: John Lopez   MRN: [id number]  Patient Class: IP- Psych   Admission Date\Time: 12/16/2022 12:03 AM  Hospital Length of Stay: 1  Primary Care Provider: Sam [facility]     Referred by: Behavioral Medicine Unit Treatment Team     Target symptoms: N/A     Patient's response to treatment: Pt did not attend     Progress toward goals: N/A     Interval History: N/A     Diagnosis:      Plan: Continue treatment on BMU

## 2022-12-18 PROCEDURE — 25000003 PHARM REV CODE 250: Performed by: PSYCHIATRY & NEUROLOGY

## 2022-12-18 PROCEDURE — 11400000 HC PSYCH PRIVATE ROOM

## 2022-12-18 RX ADMIN — DIVALPROEX SODIUM 500 MG: 500 TABLET, DELAYED RELEASE ORAL at 08:12

## 2022-12-18 RX ADMIN — MIRTAZAPINE 15 MG: 15 TABLET, FILM COATED ORAL at 08:12

## 2022-12-18 RX ADMIN — DIVALPROEX SODIUM 500 MG: 500 TABLET, DELAYED RELEASE ORAL at 09:12

## 2022-12-18 RX ADMIN — LORAZEPAM 0.5 MG: 0.5 TABLET ORAL at 09:12

## 2022-12-18 RX ADMIN — LORAZEPAM 0.5 MG: 0.5 TABLET ORAL at 08:12

## 2022-12-18 RX ADMIN — LORAZEPAM 0.5 MG: 0.5 TABLET ORAL at 02:12

## 2022-12-18 NOTE — NURSING
John is withdrawn and guarded.Flat, sad affect. Does not initiate conversation, but responds to questions asked. Sleeping and eating well. Compliant with medication, no adverse effects observed. Denies hallucinations. Q 15 min  safety checks. Will monitor mood and behavior and offer emotional support.

## 2022-12-19 PROCEDURE — 11400000 HC PSYCH PRIVATE ROOM

## 2022-12-19 PROCEDURE — 25000003 PHARM REV CODE 250: Performed by: PSYCHIATRY & NEUROLOGY

## 2022-12-19 RX ADMIN — MIRTAZAPINE 15 MG: 15 TABLET, FILM COATED ORAL at 08:12

## 2022-12-19 RX ADMIN — DIVALPROEX SODIUM 500 MG: 500 TABLET, DELAYED RELEASE ORAL at 08:12

## 2022-12-19 NOTE — PROGRESS NOTES
12/19/2022 9:55 AM   John Lopez Jr.   1985   [id number]        Psychiatry Progress Note     SUBJECTIVE:   John Lopez Jr. is a 37 y.o. male admitted for substance abuse and bizarre behavior. He is completely disheveled and malodorous. Poor eye contact and minimal self disclosure. He came out of the room for the first time yesterday. He denied any psychosis but this is questionable. He appears guarded and fearful/nontrusting. He is extremely thin. He is isolative and withdrawn. He has been compliant with medications thus far. He denied any SI/HI/death wishes. Staff did encourage him to shower. We will continue to encourage and look for adequate placement.        Current Medications:   Scheduled Meds:    divalproex  500 mg Oral BID    mirtazapine  15 mg Oral QHS    nicotine  1 patch Transdermal Daily    [START ON 12/21/2022] paliperidone palmitate  156 mg Intramuscular Q28 Days      PRN Meds: acetaminophen, aluminum-magnesium hydroxide-simethicone, hydrOXYzine pamoate, ibuprofen, magnesium hydroxide 400 mg/5 ml   Psychotherapeutics (From admission, onward)      Start     Stop Route Frequency Ordered    12/21/22 0900  paliperidone palmitate injection 156 mg         -- IM Every 28 days 12/16/22 1514    12/16/22 2100  mirtazapine tablet 15 mg         -- Oral Nightly 12/16/22 1514            Allergies:   Review of patient's allergies indicates:  No Known Allergies     OBJECTIVE:   Vitals   Vitals:    12/19/22 0604   BP: 114/72   Pulse: (!) 112   Resp: 18   Temp: 97.5 °F (36.4 °C)        Labs/Imaging/Studies:   No results found for this or any previous visit (from the past 36 hour(s)).       Medical Review Of Systems:  A comprehensive review of systems was negative.      Psychiatric Mental Status Exam:  General Appearance: disheveled, malodorous  Arousal: alert  Behavior: minimal responses  Movements and Motor Activity: no abnormal involuntary movements noted  Orientation: oriented to person, place, time, and  situation  Speech: soft  Mood: Guarded  Affect: mood-congruent  Thought Process: linear  Associations: no loosening of associations  Thought Content and Perceptions: no suicidal or homicidal ideation, no auditory or visual hallucinations, no paranoid ideation, no ideas of reference, no evidence of delusions or psychosis  Recent and Remote Memory: intact  Attention and Concentration: easily distractible  Fund of Knowledge: vocabulary appropriate  Insight: limited  Judgment: limited    ASSESSMENT/PLAN:   Diagnosis:    Schizoaffective disorder, bipolar type    Past Medical History:   Diagnosis Date    Addiction to drug     Bipolar disorder     History of psychiatric hospitalization     Schizoaffective disorder         Plan:  Continue to monitor on current regimen  Placement is needed    Expected Disposition Plan:  Residential placement needed        Mick ODOM PMHNP  Psychiatry  [facility]

## 2022-12-19 NOTE — PSYCH
MULTIDISCILINARY TEAM      ______________________________________________________________________  Psychiatrist Signature           Print Name    Credentials    Date/Time         _______________________________________________________________________  Registered Nurse Signature         Print Name    Credentials    Date/Time                  _______________________________________________________________________   Signature           Print Name    Credentials    Date/Time         _______________________________________________________________________  UR Coordinator Signature          Print Name    Credentials    Date/Time                  ESTIMATED LOS: __________      I have reviewed my treatment plan with staff and have signed the Patient Responsibilities form.      ______________________________________________________________________  Patient Signature   Print Name   Date/Time

## 2022-12-19 NOTE — GROUP NOTE
Education Group      Group Focus: Offered group on discharge planning.       Number of patients in attendance: 5    Group Start Time: 1000  Group End Time:  1100  Groups Date: 12/19/2022  Group Topic:  Behavioral Health  Group Department: [facility]  Group Facilitators:  Penny Ramírez LPN  _____________________________________________________________________    Patient Name: John Lopez .  MRN: [id number]  Patient Class: IP- Psych   Admission Date\Time: 12/16/2022 12:03 AM  Hospital Length of Stay: 3  Primary Care Provider: Sam [facility]     Referred by: Behavioral Medicine Unit Treatment Team     Target symptoms: na     Patient's response to treatment: did not attend     Progress toward goals: na     Interval History: na     Diagnosis:      Plan: Continue treatment on BMU

## 2022-12-19 NOTE — GROUP NOTE
Group Psychotherapy       Group Focus: Psychodynamic Group Psychotherapy      Number of patients in attendance: 6    Group Start Time: 0900  Group End Time:  0945  Groups Date: 12/19/2022  Group Topic:  Behavioral Health  Group Department: [facility]  Group Facilitators:  Eric Castrejon LCSW  _____________________________________________________________________    Patient Name: John Lopez   MRN: [id number]  Patient Class: IP- Psych   Admission Date\Time: 12/16/2022 12:03 AM  Hospital Length of Stay: 3  Primary Care Provider: Provider [facility]     Referred by: Behavioral Medicine Unit Treatment Team     Target symptoms: Substance Abuse and Mood Disorder     Patient's response to treatment: Not Participating     Progress toward goals: Minimal progress     Interval History: Pt did not attend group     Diagnosis:      Plan: Continue treatment on BMU

## 2022-12-20 PROCEDURE — 25000003 PHARM REV CODE 250: Performed by: PSYCHIATRY & NEUROLOGY

## 2022-12-20 PROCEDURE — S4991 NICOTINE PATCH NONLEGEND: HCPCS | Performed by: PSYCHIATRY & NEUROLOGY

## 2022-12-20 PROCEDURE — 11400000 HC PSYCH PRIVATE ROOM

## 2022-12-20 RX ORDER — MIRTAZAPINE 15 MG/1
30 TABLET, FILM COATED ORAL NIGHTLY
Status: DISCONTINUED | OUTPATIENT
Start: 2022-12-20 | End: 2022-12-23 | Stop reason: HOSPADM

## 2022-12-20 RX ORDER — BENZTROPINE MESYLATE 1 MG/1
1 TABLET ORAL 2 TIMES DAILY
Status: DISCONTINUED | OUTPATIENT
Start: 2022-12-20 | End: 2022-12-23 | Stop reason: HOSPADM

## 2022-12-20 RX ADMIN — BENZTROPINE MESYLATE 1 MG: 1 TABLET ORAL at 11:12

## 2022-12-20 RX ADMIN — MIRTAZAPINE 30 MG: 15 TABLET, FILM COATED ORAL at 08:12

## 2022-12-20 RX ADMIN — NICOTINE 1 PATCH: 14 PATCH, EXTENDED RELEASE TRANSDERMAL at 08:12

## 2022-12-20 RX ADMIN — BENZTROPINE MESYLATE 1 MG: 1 TABLET ORAL at 08:12

## 2022-12-20 NOTE — NURSING
Spit depakote in cup and tried to throw it away. I caught it and asked him why. He stated that he doesn't like to take it at night because it makes his legs restless. I advised him that him didn't need to try to hide it. He had the right to refuse. I advised him to talk with the

## 2022-12-20 NOTE — PROGRESS NOTES
[facility]  Psychiatry  Progress Note    Patient Name: John Lopez Jr.  MRN: [id number]   Code Status: Full Code  Admission Date: 12/16/2022  Hospital Length of Stay: 4 days  Expected Discharge Date:   Attending Physician: Hemal Gonzales MD  Primary Care Provider: Provider Nu    Current Legal Status: [facility]    Patient information was obtained from patient.       Subjective:     Patient is a 37 y.o., male, presents with:    Principal Problem:Schizoaffective disorder, bipolar type    Chief Complaint: restless legs    HPI: 37-year-old male who is from [place], [place] and has been staying at  shelter in [place], [place] for a few months. Bystanders called 911 because he was walking around and banging his head on a wall. He was using methamphetamine. UDS positive for amphetamine and alcohol level was 67. He drinks about a 6 pack a few times a week. He has been anxious and depressed. No recent SI/HI. He denies hallucinations. He has a history of paranoia but denies this recently. Sleep is fair and appetite is ok. He is restless today. Cooperative and thoughts a little scattered at times. He does have past psych admits and a history of bipolar disorder and possibly schizophrenia. He reports being on meds recently.            Hospital Course: 12/16/22-The patient was admitted to the psychiatric unit and monitored for safety. The medications were reconciled. A history and physical was completed by the primary care doctor and and medical issues were addressed. The patient was provided with individual and group therapy. He agrees to start Invega Sustenna injection as he was on it in the past along with depakote and remeron. Will also give  three days of Ativavn for anxiety and restlessness.    12/19/22-He is completely disheveled and malodorous. Poor eye contact and minimal self disclosure. He came out of the room for the first time yesterday. He denied any psychosis but this is  questionable. He appears guarded and fearful/nontrusting. He is extremely thin. He is isolative and withdrawn. He has been compliant with medications thus far. He denied any SI/HI/death wishes. Staff did encourage him to shower. We will continue to encourage and look for adequate placement.     12/20/22-He signed FVA. He refused Depakote due to restless legs. He is not participating in groups. Sleeping well and stays in bed. Appetite is good. He is disheveled and malodorous. He denies hallucinations or delusions but still presents as psychotic. He has poor insight and judgment. Denies SI/HI. He wants to go to a program or rehab. He may contact father to see about going home but he is reluctant to do this at this time. He tolerated Invega sustenna injection and will get second shot tomorrow. Discontinue Depakote per patient request. He agrees to increase Remeron and add Cogentin for restless legs.       Seen for treatment team.    Psychiatric Mental Status Exam:  General Appearance: disheveled  Arousal: alert  Behavior: cooperative  Movements and Motor Activity: normal  Orientation: oriented to person, place, time, and situation  Speech: normal rate, normal rhythm, normal volume, normal tone  Mood: anxious  Affect: mood-congruent  Thought Process: linear  Associations: intact  Thought Content and Perceptions: no suicidal ideation, no homicidal ideation, no auditory hallucinations, no visual hallucinations, no paranoid ideation, no ideas of reference  Recent and Remote Memory: recent memory intact, remote memory intact  Attention and Concentration: distracted  Fund of Knowledge: intact, aware of current events, vocabulary appropriate  Insight: limited  Judgment: limited    Family History       Problem Relation (Age of Onset)    No Known Problems Mother, Father          Tobacco Use    Smoking status: Every Day     Packs/day: 0.50     Types: Cigarettes    Smokeless tobacco: Never   Substance and Sexual Activity     "Alcohol use: Yes     Comment: 6  beers 2-3 time a week    Drug use: Yes     Types: Methamphetamines    Sexual activity: Not on file     Psychotherapeutics (From admission, onward)      Start     Stop Route Frequency Ordered    12/21/22 0900  paliperidone palmitate injection 156 mg         -- IM Every 28 days 12/16/22 1514    12/16/22 2100  mirtazapine tablet 15 mg         -- Oral Nightly 12/16/22 1514             Review of Systems  Objective:     Vital Signs (Most Recent):  Temp: 98.4 °F (36.9 °C) (12/19/22 1649)  Pulse: 108 (12/20/22 0610)  Resp: 18 (12/20/22 0610)  BP: 120/82 (12/20/22 0610)  SpO2: 97 % (12/20/22 0610) Vital Signs (24h Range):  Temp:  [98.4 °F (36.9 °C)] 98.4 °F (36.9 °C)  Pulse:  [] 108  Resp:  [18] 18  SpO2:  [97 %] 97 %  BP: (117-120)/(73-82) 120/82     Height: 6' 2" (188 cm)  Weight: 69.5 kg (153 lb 3.5 oz)  Body mass index is 19.67 kg/m².    No intake or output data in the 24 hours ending 12/20/22 0904    Physical Exam     Significant Labs: Last 72 Hours:   Recent Lab Results       None            Significant Imaging: I have reviewed all pertinent imaging results/findings within the past 24 hours.       Scheduled Medications:   benztropine  1 mg Oral BID    mirtazapine  30 mg Oral QHS    nicotine  1 patch Transdermal Daily    [START ON 12/21/2022] paliperidone palmitate  156 mg Intramuscular Q28 Days       PRN Medications:  acetaminophen, aluminum-magnesium hydroxide-simethicone, hydrOXYzine pamoate, ibuprofen, magnesium hydroxide 400 mg/5 ml    Review of patient's allergies indicates:  No Known Allergies    Assessment/Plan:     * Schizoaffective disorder, bipolar type  He agrees to start Invega Sustenna injection as he was on it in the past along with depakote and remeron. Will also give  three days of Ativavn for anxiety and restlessness.        Need for Continued Hospitalization:  Protective inpatient psychiatric hospitalization required while a safe disposition plan is " enacted. and Requires ongoing hospitalization for stabilization of medications.    Anticipated Disposition:  Rehab Facility    Total time:  15 with greater than 50% of this time spent in counseling and/or coordination of care.       Hemal Gonzales MD   Psychiatry  [facility]

## 2022-12-20 NOTE — SUBJECTIVE & OBJECTIVE
"Seen for treatment team.    Psychiatric Mental Status Exam:  General Appearance: disheveled  Arousal: alert  Behavior: cooperative  Movements and Motor Activity: normal  Orientation: oriented to person, place, time, and situation  Speech: normal rate, normal rhythm, normal volume, normal tone  Mood: anxious  Affect: mood-congruent  Thought Process: linear  Associations: intact  Thought Content and Perceptions: no suicidal ideation, no homicidal ideation, no auditory hallucinations, no visual hallucinations, no paranoid ideation, no ideas of reference  Recent and Remote Memory: recent memory intact, remote memory intact  Attention and Concentration: distracted  Fund of Knowledge: intact, aware of current events, vocabulary appropriate  Insight: limited  Judgment: limited    Family History       Problem Relation (Age of Onset)    No Known Problems Mother, Father          Tobacco Use    Smoking status: Every Day     Packs/day: 0.50     Types: Cigarettes    Smokeless tobacco: Never   Substance and Sexual Activity    Alcohol use: Yes     Comment: 6  beers 2-3 time a week    Drug use: Yes     Types: Methamphetamines    Sexual activity: Not on file     Psychotherapeutics (From admission, onward)      Start     Stop Route Frequency Ordered    12/21/22 0900  paliperidone palmitate injection 156 mg         -- IM Every 28 days 12/16/22 1514    12/16/22 2100  mirtazapine tablet 15 mg         -- Oral Nightly 12/16/22 1514             Review of Systems  Objective:     Vital Signs (Most Recent):  Temp: 98.4 °F (36.9 °C) (12/19/22 1649)  Pulse: 108 (12/20/22 0610)  Resp: 18 (12/20/22 0610)  BP: 120/82 (12/20/22 0610)  SpO2: 97 % (12/20/22 0610) Vital Signs (24h Range):  Temp:  [98.4 °F (36.9 °C)] 98.4 °F (36.9 °C)  Pulse:  [] 108  Resp:  [18] 18  SpO2:  [97 %] 97 %  BP: (117-120)/(73-82) 120/82     Height: 6' 2" (188 cm)  Weight: 69.5 kg (153 lb 3.5 oz)  Body mass index is 19.67 kg/m².    No intake or output data in the 24 " hours ending 12/20/22 0904    Physical Exam     Significant Labs: Last 72 Hours:   Recent Lab Results       None            Significant Imaging: I have reviewed all pertinent imaging results/findings within the past 24 hours.

## 2022-12-20 NOTE — NURSING
Lying in bed, awake and alert, speech is clear, cooperative, disheveled, restless, anxious, denies any needs or acute distress, no SI, no HI, no hallucinations, will continue to monitor.  Refused Depakote tonight because he says it gives him restless legs. Took Remeron.

## 2022-12-21 PROCEDURE — 63600175 PHARM REV CODE 636 W HCPCS: Mod: JG | Performed by: PSYCHIATRY & NEUROLOGY

## 2022-12-21 PROCEDURE — S4991 NICOTINE PATCH NONLEGEND: HCPCS | Performed by: PSYCHIATRY & NEUROLOGY

## 2022-12-21 PROCEDURE — 11400000 HC PSYCH PRIVATE ROOM

## 2022-12-21 PROCEDURE — 25000003 PHARM REV CODE 250: Performed by: PSYCHIATRY & NEUROLOGY

## 2022-12-21 RX ADMIN — BENZTROPINE MESYLATE 1 MG: 1 TABLET ORAL at 09:12

## 2022-12-21 RX ADMIN — NICOTINE 1 PATCH: 14 PATCH, EXTENDED RELEASE TRANSDERMAL at 08:12

## 2022-12-21 RX ADMIN — PALIPERIDONE PALMITATE 156 MG: 156 INJECTION INTRAMUSCULAR at 08:12

## 2022-12-21 RX ADMIN — MIRTAZAPINE 30 MG: 15 TABLET, FILM COATED ORAL at 09:12

## 2022-12-21 RX ADMIN — BENZTROPINE MESYLATE 1 MG: 1 TABLET ORAL at 08:12

## 2022-12-21 NOTE — NURSING
Awake and alert in his room, responds to voice, denies any problems, polite, denies SI/HI/AVH, unkempt, very disheveled, up for snack and medication compliant, no interaction with peers, low motivation, low energy, flat and depressed, will continue to monitor.

## 2022-12-21 NOTE — GROUP NOTE
Group Psychotherapy       Group Focus: Psychodynamic Group Psychotherapy      Number of patients in attendance: 3    Group Start Time: 0900  Group End Time:  0945  Groups Date: 12/21/2022  Group Topic:  Behavioral Health  Group Department: [facility]  Group Facilitators:  Eric Castrejon LCSW  _____________________________________________________________________    Patient Name: John Lopez   MRN: [id number]  Patient Class: IP- Psych   Admission Date\Time: 12/16/2022 12:03 AM  Hospital Length of Stay: 5  Primary Care Provider: Provider Notinsystem     Referred by: Behavioral Medicine Unit Treatment Team     Target symptoms: Psychosis     Patient's response to treatment: Not Participating     Progress toward goals: Minimal progress     Interval History: Pt did not attend group     Diagnosis:      Plan: Continue treatment on BMU

## 2022-12-21 NOTE — SUBJECTIVE & OBJECTIVE
Patient was seen via video telemedicine and consented to the interview. The patient was located in [place], [place] and the provider was located in [place], [place].    Psychiatric Mental Status Exam:  General Appearance: disheveled  Arousal: alert  Behavior: cooperative  Movements and Motor Activity: normal  Orientation: oriented to person, place, time, and situation  Speech: normal rate, normal rhythm, normal volume, normal tone  Mood: good  Affect: mood-congruent  Thought Process: linear  Associations: intact  Thought Content and Perceptions: no suicidal ideation, no homicidal ideation, no auditory hallucinations, no visual hallucinations, no paranoid ideation, no ideas of reference  Recent and Remote Memory: recent memory intact, remote memory intact  Attention and Concentration: distracted  Fund of Knowledge: intact, aware of current events, vocabulary appropriate  Insight: limited  Judgment: limited    Family History       Problem Relation (Age of Onset)    No Known Problems Mother, Father          Tobacco Use    Smoking status: Every Day     Packs/day: 0.50     Types: Cigarettes    Smokeless tobacco: Never   Substance and Sexual Activity    Alcohol use: Yes     Comment: 6  beers 2-3 time a week    Drug use: Yes     Types: Methamphetamines    Sexual activity: Not on file     Psychotherapeutics (From admission, onward)      Start     Stop Route Frequency Ordered    12/21/22 0900  paliperidone palmitate injection 156 mg         -- IM Every 28 days 12/21/22 0820    12/20/22 2100  mirtazapine tablet 30 mg         -- Oral Nightly 12/20/22 0906             Review of Systems  Objective:     Vital Signs (Most Recent):  Temp: 98 °F (36.7 °C) (12/21/22 0611)  Pulse: (!) 115 (12/21/22 0611)  Resp: 18 (12/21/22 0611)  BP: 108/71 (12/21/22 0611)  SpO2: 96 % (12/21/22 0611)   Vital Signs (24h Range):  Temp:  [98 °F (36.7 °C)] 98 °F (36.7 °C)  Pulse:  [] 115  Resp:  [16-18] 18  SpO2:  [96 %] 96 %  BP: (108-111)/(68-71) 108/71  "    Height: 6' 2" (188 cm)  Weight: 69.5 kg (153 lb 3.5 oz)  Body mass index is 19.67 kg/m².    No intake or output data in the 24 hours ending 12/21/22 1217    Physical Exam     Significant Labs: Last 72 Hours:   Recent Lab Results       None            Significant Imaging: None  "

## 2022-12-21 NOTE — GROUP NOTE
Group Psychotherapy       Group Focus: medication management      Number of patients in attendance: 3    Group Start Time: 1115  Group End Time:  1145  Groups Date: 12/21/2022  Group Topic:  Behavioral Health  Group Department: [facility]  Group Facilitators:  Judie Camacho LPN  _____________________________________________________________________    Patient Name: John Mooney John David  MRN: [id number]  Patient Class: IP- Psych   Admission Date\Time: 12/16/2022 12:03 AM  Hospital Length of Stay: 5  Primary Care Provider: Provider Notinsystem     Referred by: Behavioral Medicine Unit Treatment Team     Target symptoms: Psychosis     Patient's response to treatment: Not Participating     Progress toward goals: Minimal progress     Interval History: pt did not attend     Diagnosis:      Plan: Continue treatment on BMU

## 2022-12-21 NOTE — PLAN OF CARE
Spoke with [facility].  They do not have housing options for patients.  They have been unable to find John for some time.  They do plan to contact [facility] to find out his status there.

## 2022-12-21 NOTE — GROUP NOTE
Group Psychotherapy       Group Focus: Life Skills      Number of patients in attendance: 1    Group Start Time: 1400  Group End Time:  1445  Groups Date: 12/21/2022  Group Topic:  Behavioral Health  Group Department: [facility]  Group Facilitators:  Yessy Aguiar RN  _____________________________________________________________________    Patient Name: John Lopez Jr.  MRN: [id number]  Patient Class: IP- Psych   Admission Date\Time: 12/16/2022 12:03 AM  Hospital Length of Stay: 5  Primary Care Provider: Provider Notinsystem     Referred by: Behavioral Medicine Unit Treatment Team     Target symptoms: Substance Abuse     Patient's response to treatment: Not Participating     Progress toward goals: Not progressing     Interval History: was in the room only. No participation     Diagnosis: Meth abuse, schizoaffective disorder     Plan: Continue treatment on BMU

## 2022-12-21 NOTE — NURSING
FLAT AFFECT. ANXIOUS, WITHDRAWN. BIZARRE, DISHEVELED APPEARANCE.DENIES SI, AVH. THOUGHTS ARE MORE CLEAR AND GOAL ORIENTED. AWAITING PLACEMENT. Q15MIN SAFETY CHECKS.

## 2022-12-21 NOTE — PROGRESS NOTES
[facility]  Psychiatry  Progress Note    Patient Name: John Lopez Jr.  MRN: [id number]   Code Status: Full Code  Admission Date: 12/16/2022  Hospital Length of Stay: 5 days  Expected Discharge Date:   Attending Physician: Hemal Gonzales MD  Primary Care Provider: Provider Nu    Current Legal Status: Located within [facility]    Patient information was obtained from patient.       Subjective:     Patient is a 37 y.o., male, presents with:    Principal Problem:Schizoaffective disorder, bipolar type    Chief Complaint: trying not to be tired    HPI: 37-year-old male who is from [place], [place] and has been staying at  shelter in [place], [place] for a few months. Bystanders called 911 because he was walking around and banging his head on a wall. He was using methamphetamine. UDS positive for amphetamine and alcohol level was 67. He drinks about a 6 pack a few times a week. He has been anxious and depressed. No recent SI/HI. He denies hallucinations. He has a history of paranoia but denies this recently. Sleep is fair and appetite is ok. He is restless today. Cooperative and thoughts a little scattered at times. He does have past psych admits and a history of bipolar disorder and possibly schizophrenia. He reports being on meds recently.            Hospital Course: 12/16/22-The patient was admitted to the psychiatric unit and monitored for safety. The medications were reconciled. A history and physical was completed by the primary care doctor and and medical issues were addressed. The patient was provided with individual and group therapy. He agrees to start Invega Sustenna injection as he was on it in the past along with depakote and remeron. Will also give  three days of Ativavn for anxiety and restlessness.    12/19/22-He is completely disheveled and malodorous. Poor eye contact and minimal self disclosure. He came out of the room for the first time yesterday. He denied any psychosis but this is  questionable. He appears guarded and fearful/nontrusting. He is extremely thin. He is isolative and withdrawn. He has been compliant with medications thus far. He denied any SI/HI/death wishes. Staff did encourage him to shower. We will continue to encourage and look for adequate placement.     12/20/22-He signed FVA. He refused Depakote due to restless legs. He is not participating in groups. Sleeping well and stays in bed. Appetite is good. He is disheveled and malodorous. He denies hallucinations or delusions but still presents as psychotic. Thoughts are more clear, however, and much more logical and coherent. He has poor insight and judgment. Denies SI/HI. He wants to go to a program or rehab. He may contact father to see about going home but he is reluctant to do this at this time. He tolerated Invega sustenna injection and will get second shot tomorrow. Discontinue Depakote per patient request. He agrees to increase Remeron and add Cogentin for restless legs.     12/21/22-Mood is good. No agitation. He got second Invega Sustenna injection. No side effects. Sleeping a lot. Denies active hallucinations or delusions. Thoughts are more clear and he answers questions more appropriately. No si/hi. He was not accepted to shelter. Trying to get in touch with father. Will monitor patient and work on safe discharge plan.      Patient was seen via video telemedicine and consented to the interview. The patient was located in [place], [place] and the provider was located in [place], [place].    Psychiatric Mental Status Exam:  General Appearance: disheveled  Arousal: alert  Behavior: cooperative  Movements and Motor Activity: normal  Orientation: oriented to person, place, time, and situation  Speech: normal rate, normal rhythm, normal volume, normal tone  Mood: good  Affect: mood-congruent  Thought Process: linear  Associations: intact  Thought Content and Perceptions: no suicidal ideation, no homicidal ideation, no auditory  "hallucinations, no visual hallucinations, no paranoid ideation, no ideas of reference  Recent and Remote Memory: recent memory intact, remote memory intact  Attention and Concentration: distracted  Fund of Knowledge: intact, aware of current events, vocabulary appropriate  Insight: limited  Judgment: limited    Family History       Problem Relation (Age of Onset)    No Known Problems Mother, Father          Tobacco Use    Smoking status: Every Day     Packs/day: 0.50     Types: Cigarettes    Smokeless tobacco: Never   Substance and Sexual Activity    Alcohol use: Yes     Comment: 6  beers 2-3 time a week    Drug use: Yes     Types: Methamphetamines    Sexual activity: Not on file     Psychotherapeutics (From admission, onward)      Start     Stop Route Frequency Ordered    12/21/22 0900  paliperidone palmitate injection 156 mg         -- IM Every 28 days 12/21/22 0820    12/20/22 2100  mirtazapine tablet 30 mg         -- Oral Nightly 12/20/22 0906             Review of Systems  Objective:     Vital Signs (Most Recent):  Temp: 98 °F (36.7 °C) (12/21/22 0611)  Pulse: (!) 115 (12/21/22 0611)  Resp: 18 (12/21/22 0611)  BP: 108/71 (12/21/22 0611)  SpO2: 96 % (12/21/22 0611)   Vital Signs (24h Range):  Temp:  [98 °F (36.7 °C)] 98 °F (36.7 °C)  Pulse:  [] 115  Resp:  [16-18] 18  SpO2:  [96 %] 96 %  BP: (108-111)/(68-71) 108/71     Height: 6' 2" (188 cm)  Weight: 69.5 kg (153 lb 3.5 oz)  Body mass index is 19.67 kg/m².    No intake or output data in the 24 hours ending 12/21/22 1217    Physical Exam     Significant Labs: Last 72 Hours:   Recent Lab Results       None            Significant Imaging: None       Scheduled Medications:   benztropine  1 mg Oral BID    mirtazapine  30 mg Oral QHS    nicotine  1 patch Transdermal Daily    paliperidone palmitate  156 mg Intramuscular Q28 Days       PRN Medications:  acetaminophen, aluminum-magnesium hydroxide-simethicone, hydrOXYzine pamoate, ibuprofen, magnesium " hydroxide 400 mg/5 ml    Review of patient's allergies indicates:  No Known Allergies    Assessment/Plan:     * Schizoaffective disorder, bipolar type  Monitor on meds. Doing well overall. Work on safe discharge plan as he refuses rehab placement.         Need for Continued Hospitalization:  Protective inpatient psychiatric hospitalization required while a safe disposition plan is enacted. and Requires ongoing hospitalization for stabilization of medications.    Anticipated Disposition:  Home or Self Care    Total time:  15 with greater than 50% of this time spent in counseling and/or coordination of care.       Hemal Gonzales MD   Psychiatry  [facility]

## 2022-12-21 NOTE — PLAN OF CARE
Awake and alert in his room, responds to voice, denies any problems, polite, denies SI/HI/AVH, unkempt, very disheveled, up for snack and medication compliant, no interaction with peers, low motivation, low energy, flat and depressed.  Pt did receive his second shot of Invega this am.    Had a discussion of discharge plan with patient.  He is trying to contact his family but has yet to be successful.  Pt does not want rehab.  Pt has no funds to pay for housing.  I had contact the [facility] this am as they are expanding their capacity due to the cold.  They said they were doing that on 12/22/22.  I later received a call from the [facility] [place] security representing the [facility] that runs the shelter that they did not think putting a mental health patient in the shelter is appropriate.  At this time patients discharge plan is not determined but we will not be able to discharge if patient cannot go home or a shelter due to the cold that is coming over the [place] weekend.

## 2022-12-22 PROCEDURE — S4991 NICOTINE PATCH NONLEGEND: HCPCS | Performed by: PSYCHIATRY & NEUROLOGY

## 2022-12-22 PROCEDURE — 25000003 PHARM REV CODE 250: Performed by: PSYCHIATRY & NEUROLOGY

## 2022-12-22 PROCEDURE — 11400000 HC PSYCH PRIVATE ROOM

## 2022-12-22 RX ORDER — MIRTAZAPINE 30 MG/1
30 TABLET, FILM COATED ORAL NIGHTLY
Qty: 30 TABLET | Refills: 11 | Status: SHIPPED | OUTPATIENT
Start: 2022-12-22 | End: 2023-12-22

## 2022-12-22 RX ORDER — BENZTROPINE MESYLATE 1 MG/1
1 TABLET ORAL 2 TIMES DAILY
Qty: 60 TABLET | Refills: 11 | Status: SHIPPED | OUTPATIENT
Start: 2022-12-22 | End: 2023-12-22

## 2022-12-22 RX ADMIN — BENZTROPINE MESYLATE 1 MG: 1 TABLET ORAL at 08:12

## 2022-12-22 RX ADMIN — MIRTAZAPINE 30 MG: 15 TABLET, FILM COATED ORAL at 08:12

## 2022-12-22 RX ADMIN — IBUPROFEN 400 MG: 400 TABLET, FILM COATED ORAL at 03:12

## 2022-12-22 RX ADMIN — NICOTINE 1 PATCH: 14 PATCH, EXTENDED RELEASE TRANSDERMAL at 08:12

## 2022-12-22 NOTE — GROUP NOTE
Group Psychotherapy       Group Focus: Promoting Healthy Lifestyles      Number of patients in attendance: 2  Group Start Time: 1400  Group End Time:  1445  Groups Date: 12/22/2022  Group Topic:  Behavioral Health  Group Department: [facility]  Group Facilitators:  Yessy Aguiar RN  _____________________________________________________________________    Patient Name: John Lopez Jr.  MRN: [id number]  Patient Class: IP- Psych   Admission Date\Time: 12/16/2022 12:03 AM  Hospital Length of Stay: 6  Primary Care Provider: Provider Not[facility]ystem     Referred by: Behavioral Medicine Unit Treatment Team     Target symptoms: Substance Abuse and Psychosis     Patient's response to treatment: Not Participating     Progress toward goals: Not progressing     Interval History: he was in and out of the room but no participation     Diagnosis: SAD, methamphetamine abuse     Plan: Continue treatment on BMU

## 2022-12-22 NOTE — PLAN OF CARE
Referrals sent to [place] [facility] and [facility].  Still trying to contact family.

## 2022-12-22 NOTE — PROGRESS NOTES
12/22/2022 8:25 AM   John Lopez Jr.   1985   [id number]        Psychiatry Progress Note     SUBJECTIVE:   John Lopez Jr. is a 37 y.o. male admitted for bizarre behavior and substance abuse. Today he presents with some improvement. Denies any psychosis. Sleeping better. Remains guarded; poor eye contact. Starting showering but still has not brushed his hair. He stated that he would be interested in going [place] [facility] and continuing with the residential program that they have there. Tolerated the second Invega injection and willing to continue with it in the future. Will discharge once accepted into the rehab.        Current Medications:   Scheduled Meds:    benztropine  1 mg Oral BID    mirtazapine  30 mg Oral QHS    nicotine  1 patch Transdermal Daily    paliperidone palmitate  156 mg Intramuscular Q28 Days      PRN Meds: acetaminophen, aluminum-magnesium hydroxide-simethicone, hydrOXYzine pamoate, ibuprofen, magnesium hydroxide 400 mg/5 ml   Psychotherapeutics (From admission, onward)      Start     Stop Route Frequency Ordered    12/21/22 0900  paliperidone palmitate injection 156 mg         -- IM Every 28 days 12/21/22 0820    12/20/22 2100  mirtazapine tablet 30 mg         -- Oral Nightly 12/20/22 0906            Allergies:   Review of patient's allergies indicates:  No Known Allergies     OBJECTIVE:   Vitals   Vitals:    12/22/22 0600   BP: 108/62   Pulse: (!) 113   Resp: 18   Temp: 98.4 °F (36.9 °C)        Labs/Imaging/Studies:   No results found for this or any previous visit (from the past 36 hour(s)).       Medical Review Of Systems:  A comprehensive review of systems was negative.      Psychiatric Mental Status Exam:  General Appearance: dressed in hospital garb  Arousal: alert  Behavior: cooperative  Movements and Motor Activity: no tics, no tremors, no akathisia, no dystonia, no evidence of tardive dyskinesia  Orientation: oriented to person, place, time, and situation  Speech: normal rate,  rhythm, volume, tone and pitch  Mood: Anxious  Affect: mood-congruent  Thought Process: linear  Associations: no loosening of associations  Thought Content and Perceptions: no suicidal or homicidal ideation, no auditory or visual hallucinations, no paranoid ideation, no ideas of reference, no evidence of delusions or psychosis  Recent and Remote Memory: intact  Attention and Concentration: easily distractible  Fund of Knowledge: vocabulary appropriate  Insight: limited  Judgment: limited    ASSESSMENT/PLAN:   Diagnosis:  SCHIZOPHRENIA AND OTHER PSYCHOTIC DISORDERS; Schizoaffective Disorder  bipolar type      Past Medical History:   Diagnosis Date    Addiction to drug     Bipolar disorder     History of psychiatric hospitalization     Schizoaffective disorder         Plan:  Awaiting acceptance into [facility]    Expected Disposition Plan: Substance treatment program        Mick ODOM PMHNP  Psychiatry  [facility] [place] [facility]

## 2022-12-22 NOTE — NURSING
Blunted affect, withdrawn behavior. He has decided to go to Rehab. Referrals sent to [facility]. He showers but is still disheveled. Q15min safety checks.

## 2022-12-22 NOTE — CARE UPDATE
Transportation set up with [facility] for tomorrow going to [facility] in [place].  Ride # [id number]

## 2022-12-22 NOTE — PROGRESS NOTES
Discharge instructions given going to [facility] in [facility] [phone], take medication as ordered

## 2022-12-22 NOTE — GROUP NOTE
Nursing Group       Group Focus: Communication Skills      Number of patients in attendance: 3    Group Start Time: 1000  Group End Time:  1030  Groups Date: 12/22/2022  Group Topic:  Behavioral Health  Group Department: [facility]  Group Facilitators:  Judie Camacho LPN  _____________________________________________________________________    Patient Name: John Huffkezia David  MRN: [id number]  Patient Class: IP- Psych   Admission Date\Time: 12/16/2022 12:03 AM  Hospital Length of Stay: 6  Primary Care Provider: Provider Notinsystem     Referred by: Behavioral Medicine Unit Treatment Team     Target symptoms: Psychosis     Patient's response to treatment: Active Listening     Progress toward goals: Progressing slowly     Interval History: N/A     Diagnosis: bipolar disorder     Plan: Continue treatment on [facility]

## 2022-12-22 NOTE — NURSING
John denied SI/HI and verbally contracted for safety. Refuses Depakote due to restless legs. He showers but continues to appear unkempt. He presents anxious and withdrawn. Bizarre appearance. Flat affect. He remains guarded. Denies hallucinations. No delusions noted. Functional status is independent. Appetite and sleep are adequate. Encouragement given. Education discussed. Positive coping skills encouraged. Q15 observations maintained for safety and precautions. He is being monitored closely.

## 2022-12-23 VITALS
BODY MASS INDEX: 19.67 KG/M2 | RESPIRATION RATE: 18 BRPM | SYSTOLIC BLOOD PRESSURE: 117 MMHG | WEIGHT: 153.25 LBS | HEIGHT: 74 IN | HEART RATE: 104 BPM | DIASTOLIC BLOOD PRESSURE: 67 MMHG | OXYGEN SATURATION: 97 % | TEMPERATURE: 98 F

## 2022-12-23 PROCEDURE — 25000003 PHARM REV CODE 250: Performed by: PSYCHIATRY & NEUROLOGY

## 2022-12-23 RX ADMIN — BENZTROPINE MESYLATE 1 MG: 1 TABLET ORAL at 08:12

## 2022-12-23 NOTE — NURSING
John is scheduled for discharge tomorrow. He has shown improvement. Remains withdrawn. He does not appear anxious. Appetite and sleep remain adequate. No S/S psychosis. Scheduled to D/C to [facility]. Encouragement given. Education provided. (+) coping skills reinforced. Q15 observations maintained for safety. He is being monitored closely.

## 2022-12-23 NOTE — NURSING
Report called into [facility] with [facility] in [place]. John is cooperative with improved ADL's. Med compliant. No complaints of side effects.

## 2022-12-23 NOTE — PROGRESS NOTES
Discharged to [place] via Medicaid transportation. Denies SI /HI and a/v hallucinations. Improved interaction with staff. Remains withdrawn around peers. Improved sleep cycle and appetite. Med compliant. No c/o side effects

## 2022-12-28 NOTE — DISCHARGE SUMMARY
[facility]  Psychiatry  Discharge Summary      Patient Name: John Lopez Jr.  MRN: [id number]  Admission Date: 12/16/2022  Hospital Length of Stay: 7 days  Discharge Date and Time:  12/27/2022 8:24 PM  Attending Physician: Hemal Gonzales MD.  Discharging Provider: MYRANDA Carranza  Primary Care Provider: Sam Notinsystem    HPI:   37-year-old male who is from [place], [place] and has been staying at  shelter in [place], [place] for a few months. Bystanders called 911 because he was walking around and banging his head on a wall. He was using methamphetamine. UDS positive for amphetamine and alcohol level was 67. He drinks about a 6 pack a few times a week. He has been anxious and depressed. No recent SI/HI. He denies hallucinations. He has a history of paranoia but denies this recently. Sleep is fair and appetite is ok. He is restless today. Cooperative and thoughts a little scattered at times. He does have past psych admits and a history of bipolar disorder and possibly schizophrenia. He reports being on meds recently.            Hospital Course:   12/16/22-The patient was admitted to the psychiatric unit and monitored for safety. The medications were reconciled. A history and physical was completed by the primary care doctor and and medical issues were addressed. The patient was provided with individual and group therapy. He agrees to start Invega Sustenna injection as he was on it in the past along with depakote and remeron. Will also give  three days of Ativavn for anxiety and restlessness.    12/19/22-He is completely disheveled and malodorous. Poor eye contact and minimal self disclosure. He came out of the room for the first time yesterday. He denied any psychosis but this is questionable. He appears guarded and fearful/nontrusting. He is extremely thin. He is isolative and withdrawn. He has been compliant with medications thus far. He denied any SI/HI/death wishes.  Staff did encourage him to shower. We will continue to encourage and look for adequate placement.     12/20/22-He signed FVA. He refused Depakote due to restless legs. He is not participating in groups. Sleeping well and stays in bed. Appetite is good. He is disheveled and malodorous. He denies hallucinations or delusions but still presents as psychotic. Thoughts are more clear, however, and much more logical and coherent. He has poor insight and judgment. Denies SI/HI. He wants to go to a program or rehab. He may contact father to see about going home but he is reluctant to do this at this time. He tolerated Invega sustenna injection and will get second shot tomorrow. Discontinue Depakote per patient request. He agrees to increase Remeron and add Cogentin for restless legs.     12/21/22-Mood is good. No agitation. He got second Invega Sustenna injection. No side effects. Sleeping a lot. Denies active hallucinations or delusions. Thoughts are more clear and he answers questions more appropriately. No si/hi. He was not accepted to shelter. Trying to get in touch with father. Will monitor patient and work on safe discharge plan.    12/22/22-Today he presents with some improvement. Denies any psychosis. Sleeping better. Remains guarded; poor eye contact. Starting showering but still has not brushed his hair. He stated that he would be interested in going [place] and continuing with the residential program that they have there. Tolerated the second Invega injection and willing to continue with it in the future. Will discharge once accepted into the rehab.   12/22/22-Addendum: Patient was accepted into rehab this afternoon. Discharge will be scheduled for tomorrow.    Psychiatric Mental Status Exam:  General Appearance: appears stated age, well-developed, well-nourished  Arousal: alert  Behavior: cooperative  Movements and Motor Activity: no abnormal involuntary movements noted  Orientation: oriented to person, place,  time, and situation  Speech: normal rate, normal rhythm, normal volume, normal tone  Mood: improved  Affect: mood-congruent  Thought Process: linear  Associations: intact  Thought Content and Perceptions: no suicidal ideation, no homicidal ideation, no auditory hallucinations, no visual hallucinations, no paranoid ideation, no ideas of reference  Recent and Remote Memory: recent memory intact, remote memory intact  Attention and Concentration: intact, attentive to conversation  Fund of Knowledge: intact, aware of current events, vocabulary appropriate  Insight: intact  Judgment: intact        Goals of Care Treatment Preferences:  Code Status: Full Code      * No surgery found *     Consults:   Physical Exam     Significant Labs:   Last 72 Hours:   Recent Lab Results     None          Significant Imaging: None    Smoking Cessation:   Does the patient smoke? Yes  Does the patient want a prescription for Smoking Cessation? No  Does the patient want counseling for Smoking Cessation? No         Pending Diagnostic Studies:     None        Final Active Diagnoses:    Diagnosis Date Noted POA    PRINCIPAL PROBLEM:  Schizoaffective disorder, bipolar type [F25.0] 12/16/2022 Yes    Methamphetamine abuse [F15.10] 12/16/2022 Yes    Tobacco abuse [Z72.0] 12/16/2022 Yes      Problems Resolved During this Admission:    Diagnosis Date Noted Date Resolved POA    Bizarre behavior [R46.2] 12/16/2022 12/16/2022 Yes      No new Assessment & Plan notes have been filed under this hospital service since the last note was generated.  Service: Psychiatry      Functional Condition: Independent ambulation, Independent communication skills and Able to participate in aftercare arrangements independently    Discharged Condition: fair    Disposition: Rehab Facility    Follow Up:   Follow-up Information     Provider [facility] Follow up.           [place] [facility]. Go on 12/23/2022.    Contact information:  [phone]                      Patient Instructions:   No discharge procedures on file.  Medications:  Reconciled Home Medications:      Medication List      START taking these medications    benztropine 1 MG tablet  Commonly known as: COGENTIN  Take 1 tablet (1 mg total) by mouth 2 (two) times daily.        CHANGE how you take these medications    mirtazapine 30 MG tablet  Commonly known as: REMERON  Take 1 tablet (30 mg total) by mouth every evening.  What changed:   · medication strength  · how much to take        STOP taking these medications    divalproex 500 MG Tbec  Commonly known as: DEPAKOTE     EScitalopram oxalate 20 MG tablet  Commonly known as: LEXAPRO     paliperidone palmitate 156 mg/mL Syrg injection  Commonly known as: INVEGA SUSTENNA          Is patient being discharged on multiple antipsychotics? No        Total time:30 with greater than 50% of this time spent in counseling and/or coordination of care.     All elements of the transition record were discussed with the patient at discharge and patient agrees to this plan.    Mick Whyte, PMHNP  Psychiatry  [facility]

## 2023-01-05 ENCOUNTER — HOSPITAL ENCOUNTER (EMERGENCY)
Facility: HOSPITAL | Age: 38
Discharge: LEFT BEFORE DISCHARGE | End: 2023-01-05
Payer: MEDICAID

## 2023-01-05 VITALS
RESPIRATION RATE: 18 BRPM | BODY MASS INDEX: 21.17 KG/M2 | OXYGEN SATURATION: 98 % | HEART RATE: 113 BPM | HEIGHT: 74 IN | SYSTOLIC BLOOD PRESSURE: 106 MMHG | TEMPERATURE: 99 F | DIASTOLIC BLOOD PRESSURE: 65 MMHG | WEIGHT: 165 LBS

## 2023-01-05 DIAGNOSIS — R07.9 CHEST PAIN: ICD-10-CM

## 2023-01-05 LAB
ALBUMIN SERPL-MCNC: 4 G/DL (ref 3.5–5)
ALBUMIN/GLOB SERPL: 1.3 RATIO (ref 1.1–2)
ALP SERPL-CCNC: 88 UNIT/L (ref 40–150)
ALT SERPL-CCNC: 15 UNIT/L (ref 0–55)
AST SERPL-CCNC: 18 UNIT/L (ref 5–34)
BASOPHILS # BLD AUTO: 0.06 X10(3)/MCL (ref 0–0.2)
BASOPHILS NFR BLD AUTO: 0.6 %
BILIRUBIN DIRECT+TOT PNL SERPL-MCNC: 0.2 MG/DL
BUN SERPL-MCNC: 6.3 MG/DL (ref 8.9–20.6)
CALCIUM SERPL-MCNC: 9.4 MG/DL (ref 8.4–10.2)
CHLORIDE SERPL-SCNC: 107 MMOL/L (ref 98–107)
CO2 SERPL-SCNC: 29 MMOL/L (ref 22–29)
CREAT SERPL-MCNC: 0.85 MG/DL (ref 0.73–1.18)
EOSINOPHIL # BLD AUTO: 0.12 X10(3)/MCL (ref 0–0.9)
EOSINOPHIL NFR BLD AUTO: 1.3 %
ERYTHROCYTE [DISTWIDTH] IN BLOOD BY AUTOMATED COUNT: 13.9 % (ref 11.6–14.4)
ETHANOL SERPL-MCNC: 155 MG/DL
GFR SERPLBLD CREATININE-BSD FMLA CKD-EPI: >90 MLS/MIN/1.73/M2
GLOBULIN SER-MCNC: 3.1 GM/DL (ref 2.4–3.5)
GLUCOSE SERPL-MCNC: 93 MG/DL (ref 74–100)
HCT VFR BLD AUTO: 43.6 % (ref 42–52)
HGB BLD-MCNC: 15.2 GM/DL (ref 14–18)
IMM GRANULOCYTES # BLD AUTO: 0.04 X10(3)/MCL (ref 0–0.04)
IMM GRANULOCYTES NFR BLD AUTO: 0.4 %
LITHIUM SERPL-MCNC: <0.1 MMOL/L (ref 0.5–1.2)
LYMPHOCYTES # BLD AUTO: 2.86 X10(3)/MCL (ref 0.6–4.6)
LYMPHOCYTES NFR BLD AUTO: 30.2 %
MCH RBC QN AUTO: 31.5 PG
MCHC RBC AUTO-ENTMCNC: 34.9 MG/DL (ref 33–36)
MCV RBC AUTO: 90.3 FL (ref 80–94)
MONOCYTES # BLD AUTO: 0.5 X10(3)/MCL (ref 0.1–1.3)
MONOCYTES NFR BLD AUTO: 5.3 %
NEUTROPHILS # BLD AUTO: 5.9 X10(3)/MCL (ref 2.1–9.2)
NEUTROPHILS NFR BLD AUTO: 62.2 %
NRBC BLD AUTO-RTO: 0 % (ref 0–1)
PLATELET # BLD AUTO: 268 X10(3)/MCL (ref 140–371)
PMV BLD AUTO: 11 FL (ref 9.4–12.4)
POTASSIUM SERPL-SCNC: 3.8 MMOL/L (ref 3.5–5.1)
PROT SERPL-MCNC: 7.1 GM/DL (ref 6.4–8.3)
RBC # BLD AUTO: 4.83 X10(6)/MCL (ref 4.7–6.1)
SODIUM SERPL-SCNC: 143 MMOL/L (ref 136–145)
WBC # SPEC AUTO: 9.5 X10(3)/MCL (ref 4.5–11.5)

## 2023-01-05 PROCEDURE — 85025 COMPLETE CBC W/AUTO DIFF WBC: CPT | Performed by: PHYSICIAN ASSISTANT

## 2023-01-05 PROCEDURE — 93005 ELECTROCARDIOGRAM TRACING: CPT

## 2023-01-05 PROCEDURE — 82077 ASSAY SPEC XCP UR&BREATH IA: CPT | Performed by: PHYSICIAN ASSISTANT

## 2023-01-05 PROCEDURE — 80178 ASSAY OF LITHIUM: CPT | Performed by: PHYSICIAN ASSISTANT

## 2023-01-05 PROCEDURE — 93010 ELECTROCARDIOGRAM REPORT: CPT | Mod: ,,, | Performed by: INTERNAL MEDICINE

## 2023-01-05 PROCEDURE — 80053 COMPREHEN METABOLIC PANEL: CPT | Performed by: PHYSICIAN ASSISTANT

## 2023-01-05 PROCEDURE — 93010 EKG 12-LEAD: ICD-10-PCS | Mod: ,,, | Performed by: INTERNAL MEDICINE

## 2023-01-05 PROCEDURE — 99284 EMERGENCY DEPT VISIT MOD MDM: CPT | Mod: 25

## 2023-01-05 NOTE — FIRST PROVIDER EVALUATION
"Medical screening examination initiated.  I have conducted a focused provider triage encounter, findings are as follows:    Brief history of present illness:  37-year-old male presents to ED for evaluation of chest wall pain worse on palpation.  Positive ETOH use today.  Patient also requesting refills on his mental health medication.  Denies any SI or HI.    Vitals:    01/05/23 1644   BP: 106/65   Pulse: (!) 113   Resp: 18   Temp: 98.6 °F (37 °C)   TempSrc: Temporal   SpO2: 98%   Weight: 74.8 kg (165 lb)   Height: 6' 2" (1.88 m)       Pertinent physical exam:  Patient awake sitting in wheelchair.  Positive EtOH    Brief workup plan:  Labs, chest x-ray, EKG    Preliminary workup initiated; this workup will be continued and followed by the physician or advanced practice provider that is assigned to the patient when roomed.  "

## 2023-01-06 NOTE — ED NOTES
Pt in lobby, hit wall, beligerent, using profanity, left ER, security and [place] witnessed pt leaving

## 2023-07-04 ENCOUNTER — HOSPITAL ENCOUNTER (EMERGENCY)
Facility: HOSPITAL | Age: 38
Discharge: PSYCHIATRIC OR SUBSTANCE USE FACILITY | End: 2023-07-04
Attending: EMERGENCY MEDICINE
Payer: MEDICAID

## 2023-07-04 VITALS
WEIGHT: 160 LBS | HEART RATE: 66 BPM | OXYGEN SATURATION: 99 % | DIASTOLIC BLOOD PRESSURE: 79 MMHG | BODY MASS INDEX: 20.53 KG/M2 | RESPIRATION RATE: 18 BRPM | TEMPERATURE: 97 F | SYSTOLIC BLOOD PRESSURE: 122 MMHG | HEIGHT: 74 IN

## 2023-07-04 DIAGNOSIS — F15.90 AMPHETAMINE USE: ICD-10-CM

## 2023-07-04 DIAGNOSIS — E86.0 DEHYDRATION: ICD-10-CM

## 2023-07-04 DIAGNOSIS — G43.009 MIGRAINE WITHOUT AURA AND WITHOUT STATUS MIGRAINOSUS, NOT INTRACTABLE: Primary | ICD-10-CM

## 2023-07-04 DIAGNOSIS — Z91.148 NONCOMPLIANCE WITH MEDICATION REGIMEN: ICD-10-CM

## 2023-07-04 DIAGNOSIS — F20.89 OTHER SCHIZOPHRENIA: ICD-10-CM

## 2023-07-04 LAB
ALBUMIN SERPL-MCNC: 3.7 G/DL (ref 3.5–5)
ALBUMIN/GLOB SERPL: 1.5 RATIO (ref 1.1–2)
ALP SERPL-CCNC: 92 UNIT/L (ref 40–150)
ALT SERPL-CCNC: 13 UNIT/L (ref 0–55)
AMPHET UR QL SCN: POSITIVE
APAP SERPL-MCNC: <17.4 UG/ML (ref 17.4–30)
APPEARANCE UR: CLEAR
AST SERPL-CCNC: 15 UNIT/L (ref 5–34)
BACTERIA #/AREA URNS AUTO: NORMAL /HPF
BARBITURATE SCN PRESENT UR: NEGATIVE
BASOPHILS # BLD AUTO: 0.07 X10(3)/MCL
BASOPHILS NFR BLD AUTO: 0.5 %
BENZODIAZ UR QL SCN: NEGATIVE
BILIRUB UR QL STRIP.AUTO: NEGATIVE MG/DL
BILIRUBIN DIRECT+TOT PNL SERPL-MCNC: 0.4 MG/DL
BUN SERPL-MCNC: 17.5 MG/DL (ref 8.9–20.6)
CALCIUM SERPL-MCNC: 9 MG/DL (ref 8.4–10.2)
CANNABINOIDS UR QL SCN: NEGATIVE
CHLORIDE SERPL-SCNC: 103 MMOL/L (ref 98–107)
CO2 SERPL-SCNC: 30 MMOL/L (ref 22–29)
COCAINE UR QL SCN: NEGATIVE
COLOR UR: YELLOW
CREAT SERPL-MCNC: 0.89 MG/DL (ref 0.73–1.18)
EOSINOPHIL # BLD AUTO: 0.33 X10(3)/MCL (ref 0–0.9)
EOSINOPHIL NFR BLD AUTO: 2.5 %
ERYTHROCYTE [DISTWIDTH] IN BLOOD BY AUTOMATED COUNT: 14.3 % (ref 11.5–17)
ETHANOL SERPL-MCNC: <10 MG/DL
FENTANYL UR QL SCN: NEGATIVE
GFR SERPLBLD CREATININE-BSD FMLA CKD-EPI: >60 MLS/MIN/1.73/M2
GLOBULIN SER-MCNC: 2.5 GM/DL (ref 2.4–3.5)
GLUCOSE SERPL-MCNC: 70 MG/DL (ref 74–100)
GLUCOSE UR QL STRIP.AUTO: NEGATIVE MG/DL
HCT VFR BLD AUTO: 44.2 % (ref 42–52)
HGB BLD-MCNC: 15.3 G/DL (ref 14–18)
IMM GRANULOCYTES # BLD AUTO: 0.04 X10(3)/MCL (ref 0–0.04)
IMM GRANULOCYTES NFR BLD AUTO: 0.3 %
KETONES UR QL STRIP.AUTO: NEGATIVE MG/DL
LEUKOCYTE ESTERASE UR QL STRIP.AUTO: ABNORMAL UNIT/L
LYMPHOCYTES # BLD AUTO: 1.67 X10(3)/MCL (ref 0.6–4.6)
LYMPHOCYTES NFR BLD AUTO: 12.7 %
MCH RBC QN AUTO: 32.2 PG (ref 27–31)
MCHC RBC AUTO-ENTMCNC: 34.6 G/DL (ref 33–36)
MCV RBC AUTO: 93.1 FL (ref 80–94)
MDMA UR QL SCN: NEGATIVE
MONOCYTES # BLD AUTO: 0.84 X10(3)/MCL (ref 0.1–1.3)
MONOCYTES NFR BLD AUTO: 6.4 %
NEUTROPHILS # BLD AUTO: 10.23 X10(3)/MCL (ref 2.1–9.2)
NEUTROPHILS NFR BLD AUTO: 77.6 %
NITRITE UR QL STRIP.AUTO: NEGATIVE
NRBC BLD AUTO-RTO: 0 %
OPIATES UR QL SCN: NEGATIVE
PCP UR QL: NEGATIVE
PH UR STRIP.AUTO: 6 [PH]
PH UR: 6 [PH] (ref 3–11)
PLATELET # BLD AUTO: 224 X10(3)/MCL (ref 130–400)
PMV BLD AUTO: 10.4 FL (ref 7.4–10.4)
POTASSIUM SERPL-SCNC: 4.1 MMOL/L (ref 3.5–5.1)
PROT SERPL-MCNC: 6.2 GM/DL (ref 6.4–8.3)
PROT UR QL STRIP.AUTO: NEGATIVE MG/DL
RBC # BLD AUTO: 4.75 X10(6)/MCL (ref 4.7–6.1)
RBC #/AREA URNS AUTO: <5 /HPF
RBC UR QL AUTO: NEGATIVE UNIT/L
SALICYLATES SERPL-MCNC: <5 MG/DL
SODIUM SERPL-SCNC: 139 MMOL/L (ref 136–145)
SP GR UR STRIP.AUTO: 1.01 (ref 1–1.03)
SQUAMOUS #/AREA URNS AUTO: <5 /HPF
UROBILINOGEN UR STRIP-ACNC: 0.2 MG/DL
WBC # SPEC AUTO: 13.18 X10(3)/MCL (ref 4.5–11.5)
WBC #/AREA URNS AUTO: <5 /HPF

## 2023-07-04 PROCEDURE — 80053 COMPREHEN METABOLIC PANEL: CPT | Performed by: EMERGENCY MEDICINE

## 2023-07-04 PROCEDURE — 80179 DRUG ASSAY SALICYLATE: CPT | Performed by: EMERGENCY MEDICINE

## 2023-07-04 PROCEDURE — 85025 COMPLETE CBC W/AUTO DIFF WBC: CPT | Performed by: EMERGENCY MEDICINE

## 2023-07-04 PROCEDURE — 81001 URINALYSIS AUTO W/SCOPE: CPT | Performed by: EMERGENCY MEDICINE

## 2023-07-04 PROCEDURE — 96361 HYDRATE IV INFUSION ADD-ON: CPT

## 2023-07-04 PROCEDURE — 99285 EMERGENCY DEPT VISIT HI MDM: CPT | Mod: 25

## 2023-07-04 PROCEDURE — 96375 TX/PRO/DX INJ NEW DRUG ADDON: CPT

## 2023-07-04 PROCEDURE — 96374 THER/PROPH/DIAG INJ IV PUSH: CPT

## 2023-07-04 PROCEDURE — 82077 ASSAY SPEC XCP UR&BREATH IA: CPT | Performed by: EMERGENCY MEDICINE

## 2023-07-04 PROCEDURE — 80307 DRUG TEST PRSMV CHEM ANLYZR: CPT | Performed by: EMERGENCY MEDICINE

## 2023-07-04 PROCEDURE — 63600175 PHARM REV CODE 636 W HCPCS: Performed by: EMERGENCY MEDICINE

## 2023-07-04 PROCEDURE — 25000003 PHARM REV CODE 250: Performed by: EMERGENCY MEDICINE

## 2023-07-04 PROCEDURE — 80143 DRUG ASSAY ACETAMINOPHEN: CPT | Performed by: EMERGENCY MEDICINE

## 2023-07-04 RX ORDER — PROCHLORPERAZINE EDISYLATE 5 MG/ML
10 INJECTION INTRAMUSCULAR; INTRAVENOUS
Status: COMPLETED | OUTPATIENT
Start: 2023-07-04 | End: 2023-07-04

## 2023-07-04 RX ORDER — DIPHENHYDRAMINE HYDROCHLORIDE 50 MG/ML
12.5 INJECTION INTRAMUSCULAR; INTRAVENOUS
Status: COMPLETED | OUTPATIENT
Start: 2023-07-04 | End: 2023-07-04

## 2023-07-04 RX ADMIN — SODIUM CHLORIDE 1000 ML: 9 INJECTION, SOLUTION INTRAVENOUS at 10:07

## 2023-07-04 RX ADMIN — PROCHLORPERAZINE EDISYLATE 10 MG: 5 INJECTION INTRAMUSCULAR; INTRAVENOUS at 10:07

## 2023-07-04 RX ADMIN — DIPHENHYDRAMINE HYDROCHLORIDE 12.5 MG: 50 INJECTION INTRAMUSCULAR; INTRAVENOUS at 09:07

## 2023-07-04 NOTE — ED NOTES
Bed: 21  Expected date:   Expected time:   Means of arrival:   Comments:  Psych, off meds, no SI/HI

## 2023-07-04 NOTE — ED PROVIDER NOTES
"Encounter Date: 7/4/2023    SCRIBE #1 NOTE: I, Geovanny Mcginnis, am scribing for, and in the presence of,  Dr. Castillo. I have scribed the following portions of the note - Other sections scribed: HPI, ROS, Physical Exam, MDM, Attending.     History     Chief Complaint   Patient presents with    Mental Health Problem     Pt reports per aasi with request to go to a psych facility because his "aren't working". States he stopped taking them for this reason. Denies SI/HI. Hx bipolar depression. Also reports intermittent visual hallucinations.     36 y/o male with history of bipolar, schizoaffective disorder and substance abuse presents to ED via EMS for intermittent visual hallucinations of "dots and things like that."  Pt also complains of nausea, HA and "head jolts."  He last had his meds adjusted about a month ago, which he feels did not improve his symptoms.  He has been attending outpatient treatment once a week for about 2 months, and he says his counselor/psychiatrist is not aware that his symptoms are getting worse.  Pt also reports insomnia, and he denies SI, HI, auditory hallucinations or decreased appetite.      The history is provided by the patient.   Review of patient's allergies indicates:  No Known Allergies  Past Medical History:   Diagnosis Date    Addiction to drug     Bipolar disorder     History of psychiatric hospitalization     Schizoaffective disorder      History reviewed. No pertinent surgical history.  Family History   Problem Relation Age of Onset    No Known Problems Mother     No Known Problems Father      Social History     Tobacco Use    Smoking status: Every Day     Packs/day: 0.50     Types: Cigarettes    Smokeless tobacco: Never   Substance Use Topics    Alcohol use: Yes     Comment: 6  beers 2-3 time a week    Drug use: Yes     Types: Methamphetamines     Review of Systems   Constitutional:  Negative for chills, diaphoresis and fever.   HENT:  Negative for congestion, ear pain, sinus pain " and sore throat.    Eyes:  Negative for pain, discharge and visual disturbance.   Respiratory:  Negative for cough, shortness of breath, wheezing and stridor.    Cardiovascular:  Negative for chest pain and palpitations.   Gastrointestinal:  Positive for nausea. Negative for abdominal pain, constipation, diarrhea, rectal pain and vomiting.   Genitourinary:  Negative for dysuria and hematuria.   Musculoskeletal:  Negative for back pain and myalgias.   Skin:  Negative for rash.   Neurological:  Positive for headaches. Negative for dizziness, syncope and numbness.   Hematological: Negative.    Psychiatric/Behavioral:  Positive for hallucinations (visual) and sleep disturbance. Negative for suicidal ideas.    All other systems reviewed and are negative.    Physical Exam     Initial Vitals [07/04/23 0920]   BP Pulse Resp Temp SpO2   109/67 71 18 96.8 °F (36 °C) 100 %      MAP       --         Physical Exam    Nursing note and vitals reviewed.  Constitutional: He appears well-developed and well-nourished. He is not diaphoretic. No distress.   HENT:   Head: Normocephalic and atraumatic.   Nose: Nose normal.   Mouth/Throat: Oropharynx is clear and moist.   Eyes: Conjunctivae and EOM are normal. Pupils are equal, round, and reactive to light.   Neck: Trachea normal. Neck supple.   Normal range of motion.  Cardiovascular:  Normal rate, regular rhythm, normal heart sounds and intact distal pulses.     Exam reveals no gallop and no friction rub.       No murmur heard.  Pulmonary/Chest: Breath sounds normal. No respiratory distress. He has no wheezes. He has no rhonchi. He has no rales. He exhibits no tenderness.   Abdominal: Abdomen is soft. Bowel sounds are normal. He exhibits no distension and no mass. There is no abdominal tenderness. There is no rebound.   Musculoskeletal:         General: No tenderness or edema. Normal range of motion.      Cervical back: Normal range of motion and neck supple.      Lumbar back: Normal. No  tenderness. Normal range of motion.     Neurological: He is alert and oriented to person, place, and time. He has normal strength. No cranial nerve deficit or sensory deficit.   Skin: Skin is warm and dry. Capillary refill takes less than 2 seconds. No rash and no abscess noted. No erythema. No pallor.   Psychiatric: He has a normal mood and affect. His behavior is normal. Judgment and thought content normal.   Flat affect; not suicidal or homicidal; not responding to internal stimuli        ED Course   Procedures  Labs Reviewed   COMPREHENSIVE METABOLIC PANEL - Abnormal; Notable for the following components:       Result Value    Carbon Dioxide 30 (*)     Glucose Level 70 (*)     Protein Total 6.2 (*)     All other components within normal limits   DRUG SCREEN, URINE (BEAKER) - Abnormal; Notable for the following components:    Amphetamines, Urine Positive (*)     All other components within normal limits    Narrative:     Cut off concentrations:    Amphetamines - 1000 ng/ml  Barbiturates - 200 ng/ml  Benzodiazepine - 200 ng/ml  Cannabinoids (THC) - 50 ng/ml  Cocaine - 300 ng/ml  Fentanyl - 1.0 ng/ml  MDMA - 500 ng/ml  Opiates - 300 ng/ml   Phencyclidine (PCP) - 25 ng/ml    Specimen submitted for drug analysis and tested for pH and specific gravity in order to evaluate sample integrity. Suspect tampering if specific gravity is <1.003 and/or pH is not within the range of 4.5 - 8.0  False negatives may result form substances such as bleach added to urine.  False positives may result for the presence of a substance with similar chemical structure to the drug or its metabolite.    This test provides only a PRELIMINARY analytical test result. A more specific alternate chemical method must be used in order to obtain a confirmed analytical result. Gas chromatography/mass spectrometry (GC/MS) is the preferred confirmatory method. Other chemical confirmation methods are available. Clinical consideration and professional  judgement should be applied to any drug of abuse test result, particularly when preliminary positive results are used.    Positive results will be confirmed only at the physicians request. Unconfirmed screening results are to be used only for medical purposes (treatment).        URINALYSIS, REFLEX TO URINE CULTURE - Abnormal; Notable for the following components:    Leukocyte Esterase, UA Trace (*)     All other components within normal limits   ACETAMINOPHEN LEVEL - Abnormal; Notable for the following components:    Acetaminophen Level <17.4 (*)     All other components within normal limits   CBC WITH DIFFERENTIAL - Abnormal; Notable for the following components:    WBC 13.18 (*)     MCH 32.2 (*)     Neut # 10.23 (*)     All other components within normal limits   ALCOHOL,MEDICAL (ETHANOL) - Normal   URINALYSIS, MICROSCOPIC - Normal   CBC W/ AUTO DIFFERENTIAL    Narrative:     The following orders were created for panel order CBC auto differential.  Procedure                               Abnormality         Status                     ---------                               -----------         ------                     CBC with Differential[[id number]]        Abnormal            Final result                 Please view results for these tests on the individual orders.   SALICYLATE LEVEL          Imaging Results    None          Medications   prochlorperazine injection Soln 10 mg (10 mg Intravenous Given 7/4/23 1015)   diphenhydrAMINE injection 12.5 mg (12.5 mg Intravenous Given 7/4/23 0945)   sodium chloride 0.9% bolus 1,000 mL 1,000 mL (0 mLs Intravenous Stopped 7/4/23 1116)              Scribe Attestation:   Scribe #1: I performed the above scribed service and the documentation accurately describes the services I performed. I attest to the accuracy of the note.  Comments: Attending:   Physician Attestation Statement for Scribe #1: I, Tracey Castillo MD, personally performed the services described in this  documentation. All medical record entries made by the scribe were at my direction and in my presence.  I have reviewed the chart and agree that the record reflects my personal performance and is accurate and complete.        Attending Attestation:           Physician Attestation for Scribe:  Physician Attestation Statement for Scribe #1: I, reviewed documentation, as scribed by eGovanny Mcginnis in my presence, and it is both accurate and complete.         Medical Decision Making  Differential diagnoses include, but are not limited to: schizophrenia, migraine, dehydration, drug use   CBC, CMP, ethanol, UA, UDS ordered and reviewed.  Given IV fluids, Compazine and Benadryl with improvement in his headache and spots in his vision.  At this time I see no indication for pec as patient is not suicidal, homicidal or gravely disabled.  Will attempt to place voluntarily but if unable to do so he was follow up with Psychiatry tomorrow and can be discharged    Problems Addressed:  Amphetamine use: acute illness or injury  Dehydration: acute illness or injury that poses a threat to life or bodily functions  Migraine without aura and without status migrainosus, not intractable: acute illness or injury that poses a threat to life or bodily functions  Other schizophrenia: chronic illness or injury with exacerbation, progression, or side effects of treatment    Amount and/or Complexity of Data Reviewed  External Data Reviewed: notes.  Labs: ordered.    Risk  OTC drugs.  Prescription drug management.  Diagnosis or treatment significantly limited by social determinants of health.            ED Course as of 07/04/23 1440   Tue Jul 04, 2023   1312 Feelin gbetter after meds [BS]   1440 There is a bed at [facility] in [place] [BS]      ED Course User Index  [BS] Tracey Castillo MD       Medically cleared for psychiatry placement: 7/4/2023  2:39 PM         Clinical Impression:   Final diagnoses:  [G43.009] Migraine without aura and without  status migrainosus, not intractable (Primary)  [E86.0] Dehydration  [F15.90] Amphetamine use  [F20.89] Other schizophrenia  [Z91.148] Noncompliance with medication regimen        ED Disposition Condition    Transfer to Psych Facility Stable          ED Prescriptions    None       Follow-up Information       Follow up With Specialties Details Why Contact Info    [facility], Psychiatric Facility Go today  [address] [place] [id number]  [phone]      [facility] - Emergency Dept Emergency Medicine  As needed, If symptoms worsen [facility] [facility] [id number] [phone]             Tracey Castillo MD  07/04/23 1434       Tracey Castillo MD  07/04/23 1443